# Patient Record
Sex: FEMALE | Race: WHITE | Employment: OTHER | URBAN - METROPOLITAN AREA
[De-identification: names, ages, dates, MRNs, and addresses within clinical notes are randomized per-mention and may not be internally consistent; named-entity substitution may affect disease eponyms.]

---

## 2018-02-06 PROBLEM — Z17.1 MALIGNANT NEOPLASM OF OVERLAPPING SITES OF RIGHT BREAST IN FEMALE, ESTROGEN RECEPTOR NEGATIVE (HCC): Status: ACTIVE | Noted: 2018-02-06

## 2018-02-06 PROBLEM — C50.811 MALIGNANT NEOPLASM OF OVERLAPPING SITES OF RIGHT BREAST IN FEMALE, ESTROGEN RECEPTOR NEGATIVE (HCC): Status: ACTIVE | Noted: 2018-02-06

## 2018-05-03 PROBLEM — M54.9 CHRONIC BACK PAIN: Status: ACTIVE | Noted: 2018-05-03

## 2018-05-03 PROBLEM — M79.18 MYOFASCIAL PAIN: Status: ACTIVE | Noted: 2018-05-03

## 2018-05-03 PROBLEM — G89.29 CHRONIC BACK PAIN: Status: ACTIVE | Noted: 2018-05-03

## 2018-08-08 ENCOUNTER — OFFICE VISIT (OUTPATIENT)
Dept: NEUROLOGY | Age: 73
End: 2018-08-08
Payer: MEDICARE

## 2018-08-08 VITALS
HEIGHT: 65 IN | WEIGHT: 136 LBS | HEART RATE: 70 BPM | DIASTOLIC BLOOD PRESSURE: 81 MMHG | BODY MASS INDEX: 22.66 KG/M2 | SYSTOLIC BLOOD PRESSURE: 148 MMHG

## 2018-08-08 DIAGNOSIS — G62.9 PERIPHERAL POLYNEUROPATHY: ICD-10-CM

## 2018-08-08 DIAGNOSIS — I15.8 OTHER SECONDARY HYPERTENSION: ICD-10-CM

## 2018-08-08 DIAGNOSIS — M79.18 MYOFASCIAL PAIN SYNDROME: Primary | ICD-10-CM

## 2018-08-08 PROCEDURE — G8427 DOCREV CUR MEDS BY ELIG CLIN: HCPCS | Performed by: PSYCHIATRY & NEUROLOGY

## 2018-08-08 PROCEDURE — 1090F PRES/ABSN URINE INCON ASSESS: CPT | Performed by: PSYCHIATRY & NEUROLOGY

## 2018-08-08 PROCEDURE — G8400 PT W/DXA NO RESULTS DOC: HCPCS | Performed by: PSYCHIATRY & NEUROLOGY

## 2018-08-08 PROCEDURE — G8420 CALC BMI NORM PARAMETERS: HCPCS | Performed by: PSYCHIATRY & NEUROLOGY

## 2018-08-08 PROCEDURE — 1101F PT FALLS ASSESS-DOCD LE1/YR: CPT | Performed by: PSYCHIATRY & NEUROLOGY

## 2018-08-08 PROCEDURE — 3017F COLORECTAL CA SCREEN DOC REV: CPT | Performed by: PSYCHIATRY & NEUROLOGY

## 2018-08-08 PROCEDURE — 99204 OFFICE O/P NEW MOD 45 MIN: CPT | Performed by: PSYCHIATRY & NEUROLOGY

## 2018-08-08 PROCEDURE — 1036F TOBACCO NON-USER: CPT | Performed by: PSYCHIATRY & NEUROLOGY

## 2018-08-08 PROCEDURE — 4040F PNEUMOC VAC/ADMIN/RCVD: CPT | Performed by: PSYCHIATRY & NEUROLOGY

## 2018-08-08 PROCEDURE — 1123F ACP DISCUSS/DSCN MKR DOCD: CPT | Performed by: PSYCHIATRY & NEUROLOGY

## 2018-08-08 NOTE — PROGRESS NOTES
Other Topics Concern    Not on file     Social History Narrative    No narrative on file       MEDICATIONS:     Current Outpatient Prescriptions   Medication Sig Dispense Refill    CRANBERRY PO Take 36 mg by mouth daily      COD LIVER OIL PO Take by mouth daily      levothyroxine (SYNTHROID) 112 MCG tablet   0    liothyronine (CYTOMEL) 5 MCG tablet Take 5 mcg by mouth      dicyclomine (BENTYL) 10 MG capsule Take 10 mg by mouth      CHROMIUM PO       Copper Gluconate 2 MG CAPS Copper Caps CAPSTAKE 2 CAPSULE Daily Refills: 0Active      magnesium gluconate (MAGONATE) 500 MG tablet Take 500 mg by mouth 2 times daily      Multiple Vitamins-Minerals (THERAPEUTIC MULTIVITAMIN-MINERALS) tablet Take 1 tablet by mouth daily      Turmeric (CURCUMIN 95 PO) Take by mouth      ASTAXANTHIN PO Take by mouth      BROCCOLI EXTRACT PO Take by mouth      TAURINE PO Take by mouth      Probiotic Product (PROBIOTIC ADVANCED PO) Take by mouth       No current facility-administered medications for this visit.          ALLERGIES:     Allergies   Allergen Reactions    Sulfa Antibiotics Nausea And Vomiting         REVIEW OF SYSTEMS:      CONSTITUTIONAL Weight change: , Appetite change: absent, Fatigue: absent    HEENT Ears: normal, Visual disturbance: absent    RESPIRATORY Shortness of breath: absent, Cough: absent    CARDIOVASCULAR Chest pain: absent, Leg swelling :absent    GI Constipation: absent, Diarrhea: absent, Swallowing change: absent     Urinary frequency: present, Urinary urgency: present, Urinary incontinence: absent    MUSCULOSKELETAL Neck pain: present, Back pain: present, Stiffness: present, Muscle pain: present, Joint pain: present Restless legs: present    DERMATOLOGIC Hair loss: absent, Skin changes: absent    NEUROLOGIC Memory loss: absent, Confusion: absent, Seizures: absent Trouble walking or imbalance: present, Dizziness: absent, Weakness: present, Numbness: present Tremor: absent, Spasm: absent,

## 2019-09-17 PROBLEM — M54.50 BILATERAL LOW BACK PAIN WITHOUT SCIATICA: Status: ACTIVE | Noted: 2019-09-17

## 2020-06-10 ENCOUNTER — HOSPITAL ENCOUNTER (OUTPATIENT)
Dept: MAMMOGRAPHY | Facility: CLINIC | Age: 75
Discharge: HOME | End: 2020-06-12
Payer: MEDICARE

## 2020-06-10 PROCEDURE — 77080 DXA BONE DENSITY AXIAL: CPT

## 2021-04-02 ENCOUNTER — APPOINTMENT (RX ONLY)
Dept: URBAN - METROPOLITAN AREA CLINIC 216 | Facility: CLINIC | Age: 76
Setting detail: DERMATOLOGY
End: 2021-04-02

## 2021-04-02 DIAGNOSIS — Z41.9 ENCOUNTER FOR PROCEDURE FOR PURPOSES OTHER THAN REMEDYING HEALTH STATE, UNSPECIFIED: ICD-10-CM

## 2021-04-02 PROCEDURE — ? RESTYLANE REFYNE INJECTION

## 2021-04-02 NOTE — PROCEDURE: RESTYLANE REFYNE INJECTION
Price (Use Numbers Only, No Special Characters Or $): 449 Price (Use Numbers Only, No Special Characters Or $): 778

## 2021-08-06 ENCOUNTER — HOSPITAL ENCOUNTER (INPATIENT)
Age: 76
LOS: 2 days | Discharge: HOME | DRG: 948 | End: 2021-08-10
Attending: EMERGENCY MEDICINE | Admitting: INTERNAL MEDICINE
Payer: MEDICARE

## 2021-08-06 DIAGNOSIS — R11.2 NON-INTRACTABLE VOMITING WITH NAUSEA, UNSPECIFIED VOMITING TYPE: ICD-10-CM

## 2021-08-06 DIAGNOSIS — R53.1 GENERAL WEAKNESS: Primary | ICD-10-CM

## 2021-08-06 DIAGNOSIS — R26.2 UNABLE TO AMBULATE: ICD-10-CM

## 2021-08-06 LAB
-: ABNORMAL
ABSOLUTE EOS #: 0.1 K/UL (ref 0–0.4)
ABSOLUTE IMMATURE GRANULOCYTE: ABNORMAL K/UL (ref 0–0.3)
ABSOLUTE LYMPH #: 0.8 K/UL (ref 1–4.8)
ABSOLUTE MONO #: 0.4 K/UL (ref 0.1–1.2)
ALBUMIN SERPL-MCNC: 4.7 G/DL (ref 3.5–5.2)
ALBUMIN/GLOBULIN RATIO: 1.5 (ref 1–2.5)
ALP BLD-CCNC: 75 U/L (ref 35–104)
ALT SERPL-CCNC: 16 U/L (ref 5–33)
AMORPHOUS: ABNORMAL
ANION GAP SERPL CALCULATED.3IONS-SCNC: 13 MMOL/L (ref 9–17)
AST SERPL-CCNC: 16 U/L
BACTERIA: ABNORMAL
BASOPHILS # BLD: 1 % (ref 0–2)
BASOPHILS ABSOLUTE: 0 K/UL (ref 0–0.2)
BILIRUB SERPL-MCNC: 0.4 MG/DL (ref 0.3–1.2)
BILIRUBIN URINE: NEGATIVE
BUN BLDV-MCNC: 16 MG/DL (ref 8–23)
BUN/CREAT BLD: ABNORMAL (ref 9–20)
CALCIUM SERPL-MCNC: 9.5 MG/DL (ref 8.6–10.4)
CASTS UA: ABNORMAL /LPF (ref 0–2)
CHLORIDE BLD-SCNC: 102 MMOL/L (ref 98–107)
CO2: 26 MMOL/L (ref 20–31)
COLOR: YELLOW
COMMENT UA: ABNORMAL
CREAT SERPL-MCNC: 0.7 MG/DL (ref 0.5–0.9)
CRYSTALS, UA: ABNORMAL /HPF
DIFFERENTIAL TYPE: ABNORMAL
EKG ATRIAL RATE: 62 BPM
EKG P AXIS: 61 DEGREES
EKG P-R INTERVAL: 194 MS
EKG Q-T INTERVAL: 438 MS
EKG QRS DURATION: 82 MS
EKG QTC CALCULATION (BAZETT): 444 MS
EKG R AXIS: 2 DEGREES
EKG T AXIS: 42 DEGREES
EKG VENTRICULAR RATE: 62 BPM
EOSINOPHILS RELATIVE PERCENT: 1 % (ref 1–4)
EPITHELIAL CELLS UA: ABNORMAL /HPF (ref 0–5)
GFR AFRICAN AMERICAN: >60 ML/MIN
GFR NON-AFRICAN AMERICAN: >60 ML/MIN
GFR SERPL CREATININE-BSD FRML MDRD: ABNORMAL ML/MIN/{1.73_M2}
GFR SERPL CREATININE-BSD FRML MDRD: ABNORMAL ML/MIN/{1.73_M2}
GLUCOSE BLD-MCNC: 119 MG/DL (ref 70–99)
GLUCOSE URINE: NEGATIVE
HCT VFR BLD CALC: 42.3 % (ref 36–46)
HEMOGLOBIN: 14.2 G/DL (ref 12–16)
IMMATURE GRANULOCYTES: ABNORMAL %
KETONES, URINE: ABNORMAL
LEUKOCYTE ESTERASE, URINE: NEGATIVE
LYMPHOCYTES # BLD: 10 % (ref 24–44)
MCH RBC QN AUTO: 33.3 PG (ref 26–34)
MCHC RBC AUTO-ENTMCNC: 33.6 G/DL (ref 31–37)
MCV RBC AUTO: 99.1 FL (ref 80–100)
MONOCYTES # BLD: 5 % (ref 2–11)
MUCUS: ABNORMAL
NITRITE, URINE: NEGATIVE
NRBC AUTOMATED: ABNORMAL PER 100 WBC
OTHER OBSERVATIONS UA: ABNORMAL
PDW BLD-RTO: 13.6 % (ref 12.5–15.4)
PH UA: 7.5 (ref 5–8)
PLATELET # BLD: 365 K/UL (ref 140–450)
PLATELET ESTIMATE: ABNORMAL
PMV BLD AUTO: 6.9 FL (ref 6–12)
POTASSIUM SERPL-SCNC: 3.8 MMOL/L (ref 3.7–5.3)
PROTEIN UA: NEGATIVE
RBC # BLD: 4.26 M/UL (ref 4–5.2)
RBC # BLD: ABNORMAL 10*6/UL
RBC UA: ABNORMAL /HPF (ref 0–2)
RENAL EPITHELIAL, UA: ABNORMAL /HPF
SEG NEUTROPHILS: 83 % (ref 36–66)
SEGMENTED NEUTROPHILS ABSOLUTE COUNT: 6.9 K/UL (ref 1.8–7.7)
SODIUM BLD-SCNC: 141 MMOL/L (ref 135–144)
SPECIFIC GRAVITY UA: 1.02 (ref 1–1.03)
TOTAL PROTEIN: 7.8 G/DL (ref 6.4–8.3)
TRICHOMONAS: ABNORMAL
TROPONIN INTERP: NORMAL
TROPONIN INTERP: NORMAL
TROPONIN T: NORMAL NG/ML
TROPONIN T: NORMAL NG/ML
TROPONIN, HIGH SENSITIVITY: 10 NG/L (ref 0–14)
TROPONIN, HIGH SENSITIVITY: 11 NG/L (ref 0–14)
TURBIDITY: CLEAR
URINE HGB: ABNORMAL
UROBILINOGEN, URINE: NORMAL
WBC # BLD: 8.2 K/UL (ref 3.5–11)
WBC # BLD: ABNORMAL 10*3/UL
WBC UA: ABNORMAL /HPF (ref 0–5)
YEAST: ABNORMAL

## 2021-08-06 PROCEDURE — 84484 ASSAY OF TROPONIN QUANT: CPT

## 2021-08-06 PROCEDURE — 96374 THER/PROPH/DIAG INJ IV PUSH: CPT

## 2021-08-06 PROCEDURE — 36415 COLL VENOUS BLD VENIPUNCTURE: CPT

## 2021-08-06 PROCEDURE — 93005 ELECTROCARDIOGRAM TRACING: CPT | Performed by: EMERGENCY MEDICINE

## 2021-08-06 PROCEDURE — 99284 EMERGENCY DEPT VISIT MOD MDM: CPT

## 2021-08-06 PROCEDURE — 6360000002 HC RX W HCPCS: Performed by: EMERGENCY MEDICINE

## 2021-08-06 PROCEDURE — 80053 COMPREHEN METABOLIC PANEL: CPT

## 2021-08-06 PROCEDURE — 6370000000 HC RX 637 (ALT 250 FOR IP): Performed by: EMERGENCY MEDICINE

## 2021-08-06 PROCEDURE — 2580000003 HC RX 258: Performed by: EMERGENCY MEDICINE

## 2021-08-06 PROCEDURE — 81001 URINALYSIS AUTO W/SCOPE: CPT

## 2021-08-06 PROCEDURE — 85025 COMPLETE CBC W/AUTO DIFF WBC: CPT

## 2021-08-06 PROCEDURE — 96375 TX/PRO/DX INJ NEW DRUG ADDON: CPT

## 2021-08-06 PROCEDURE — 96361 HYDRATE IV INFUSION ADD-ON: CPT

## 2021-08-06 RX ORDER — PROMETHAZINE HYDROCHLORIDE 25 MG/ML
12.5 INJECTION, SOLUTION INTRAMUSCULAR; INTRAVENOUS ONCE
Status: COMPLETED | OUTPATIENT
Start: 2021-08-06 | End: 2021-08-06

## 2021-08-06 RX ORDER — ONDANSETRON 2 MG/ML
4 INJECTION INTRAMUSCULAR; INTRAVENOUS ONCE
Status: COMPLETED | OUTPATIENT
Start: 2021-08-06 | End: 2021-08-06

## 2021-08-06 RX ORDER — 0.9 % SODIUM CHLORIDE 0.9 %
1000 INTRAVENOUS SOLUTION INTRAVENOUS ONCE
Status: COMPLETED | OUTPATIENT
Start: 2021-08-06 | End: 2021-08-06

## 2021-08-06 RX ORDER — SODIUM CHLORIDE 9 MG/ML
INJECTION, SOLUTION INTRAVENOUS CONTINUOUS
Status: DISCONTINUED | OUTPATIENT
Start: 2021-08-06 | End: 2021-08-07 | Stop reason: DRUGHIGH

## 2021-08-06 RX ORDER — MECLIZINE HCL 12.5 MG/1
25 TABLET ORAL ONCE
Status: COMPLETED | OUTPATIENT
Start: 2021-08-06 | End: 2021-08-06

## 2021-08-06 RX ADMIN — ONDANSETRON 4 MG: 2 INJECTION INTRAMUSCULAR; INTRAVENOUS at 14:36

## 2021-08-06 RX ADMIN — SODIUM CHLORIDE 1000 ML: 9 INJECTION, SOLUTION INTRAVENOUS at 14:36

## 2021-08-06 RX ADMIN — SODIUM CHLORIDE: 9 INJECTION, SOLUTION INTRAVENOUS at 18:50

## 2021-08-06 RX ADMIN — SODIUM CHLORIDE 1000 ML: 9 INJECTION, SOLUTION INTRAVENOUS at 15:50

## 2021-08-06 RX ADMIN — MECLIZINE HCL 12.5 MG 25 MG: 12.5 TABLET ORAL at 15:42

## 2021-08-06 RX ADMIN — PROMETHAZINE HYDROCHLORIDE 12.5 MG: 25 INJECTION INTRAMUSCULAR; INTRAVENOUS at 16:00

## 2021-08-06 ASSESSMENT — ENCOUNTER SYMPTOMS
SHORTNESS OF BREATH: 0
VOMITING: 1
BACK PAIN: 1
ABDOMINAL PAIN: 0
DIARRHEA: 0
CONSTIPATION: 0
NAUSEA: 1
COUGH: 0
BLOOD IN STOOL: 0
EYE PAIN: 0

## 2021-08-06 ASSESSMENT — PAIN DESCRIPTION - PROGRESSION: CLINICAL_PROGRESSION: NOT CHANGED

## 2021-08-06 ASSESSMENT — PAIN DESCRIPTION - ONSET: ONSET: GRADUAL

## 2021-08-06 ASSESSMENT — PAIN DESCRIPTION - ORIENTATION: ORIENTATION: LOWER

## 2021-08-06 ASSESSMENT — PAIN DESCRIPTION - FREQUENCY: FREQUENCY: CONTINUOUS

## 2021-08-06 ASSESSMENT — PAIN SCALES - GENERAL: PAINLEVEL_OUTOF10: 8

## 2021-08-06 ASSESSMENT — PAIN DESCRIPTION - DESCRIPTORS: DESCRIPTORS: THROBBING

## 2021-08-06 ASSESSMENT — PAIN DESCRIPTION - PAIN TYPE: TYPE: ACUTE PAIN

## 2021-08-06 ASSESSMENT — PAIN DESCRIPTION - LOCATION: LOCATION: SACRUM;BACK

## 2021-08-06 NOTE — Clinical Note
Patient Class: Observation [104]   REQUIRED: Diagnosis: Weakness [953304]   Estimated Length of Stay: Estimated stay of less than 2 midnights   Admitting Provider: Ivy Chino [[id number]]

## 2021-08-06 NOTE — ED NOTES
Pt c/o persisting nausea. Dr. Jaja Calix in with another pt, will update when he is available. Pt provided with fresh warm blanket and tissues per request.    remains at bedside. No distress noted. Will continue to monitor.         Dayna Galicia RN  08/06/21 2917

## 2021-08-06 NOTE — ED PROVIDER NOTES
[facility]  [address] [facility] [address]  Phone: [phone]        Pt Name: Noemí Bryan  MRN: [id number]  [place] 1945  Date of evaluation: 8/6/21      CHIEF COMPLAINT       Chief Complaint   Patient presents with    Medication Reaction     Prialt    Emesis    Nausea    Difficulty Walking    Chills         HISTORY OF PRESENT ILLNESS    Noemí Bryan is a 66 y.o. female who presents with medication reaction, patient went in for a injection in the pain Center today to her back the medication was pre all patient says she has had a previous reaction to it but got the medication again today she said she did well for couple hours after have the medication but as soon as she got home she developed nausea vomiting weakness chills no chest pain or shortness of breath she said she is vomited multiple times she opens her eyes she feels like vomiting patient says this was very similar to what she had last time she had the injection. Patient has chronic lower back pain    REVIEW OF SYSTEMS         Review of Systems   Constitutional: Negative for chills and fever. HENT: Negative for congestion and ear pain. Eyes: Negative for pain and visual disturbance. Respiratory: Negative for cough and shortness of breath. Cardiovascular: Negative for chest pain, palpitations and leg swelling. Gastrointestinal: Positive for nausea and vomiting. Negative for abdominal pain, blood in stool, constipation and diarrhea. Endocrine: Negative for polydipsia and polyuria. Genitourinary: Negative for difficulty urinating, dysuria and frequency. Musculoskeletal: Positive for back pain. Negative for joint swelling, myalgias, neck pain and neck stiffness. Skin: Negative for rash. Neurological: Positive for dizziness and light-headedness. Negative for weakness and headaches. Hematological: Negative for adenopathy. Does not bruise/bleed easily.    Psychiatric/Behavioral: Negative for confusion, self-injury and suicidal ideas. PAST MEDICAL HISTORY    has a past medical history of Cancer ([address] Chronic back pain, and Hyperthyroidism. SURGICAL HISTORY      has a past surgical history that includes Spine surgery (1989, 1990, 2008); Breast surgery (Left, 10/2012); and Abdominoplasty. CURRENT MEDICATIONS       Previous Medications    ASTAXANTHIN PO    Take by mouth    ATORVASTATIN (LIPITOR) 10 MG TABLET    Take 10 mg by mouth daily    BROCCOLI EXTRACT PO    Take by mouth    CHOLECALCIFEROL 50 MCG (2000 UT) TABS    Take 2,000 Units by mouth daily    CHROMIUM PO        COD LIVER OIL PO    Take by mouth daily    COPPER GLUCONATE 2 MG CAPS    Copper Caps CAPSTAKE 2 CAPSULE Daily Refills: 0Active    CRANBERRY PO    Take 36 mg by mouth daily    GABAPENTIN (NEURONTIN) 300 MG CAPSULE    take 1 capsule by mouth at bedtime    GABAPENTIN (NEURONTIN) 400 MG CAPSULE    Take 400 mg by mouth 3 times daily. LEVOTHYROXINE (SYNTHROID) 112 MCG TABLET    110 mcg     LEVOTHYROXINE (SYNTHROID) 50 MCG TABLET    take 1 tablet by mouth once daily    LIOTHYRONINE (CYTOMEL) 5 MCG TABLET    Take 5 mcg by mouth    MAGNESIUM GLUCONATE (MAGONATE) 500 MG TABLET    Take 500 mg by mouth 2 times daily    METOPROLOL SUCCINATE (TOPROL XL) 25 MG EXTENDED RELEASE TABLET    Take 25 mg by mouth nightly    MULTIPLE VITAMINS-MINERALS (THERAPEUTIC MULTIVITAMIN-MINERALS) TABLET    Take 1 tablet by mouth daily    NP THYROID 60 MG TABLET    take 1 tablet by mouth once daily ON AN EMPTY STOMACH    NYSTATIN (MYCOSTATIN) POWD POWDER    Apply topically 2 times daily    PROBIOTIC PRODUCT (PROBIOTIC ADVANCED PO)    Take by mouth    TAURINE PO    Take by mouth    TRAMADOL (ULTRAM) 50 MG TABLET    Take 50 mg by mouth every 6 hours as needed for Pain. TURMERIC (CURCUMIN 95 PO)    Take by mouth       ALLERGIES     is allergic to ziconotide acetate and sulfa antibiotics.     FAMILY HISTORY     She indicated that the status of her mother is unknown. She indicated that the status of her father is unknown. She indicated that the status of her sister is unknown. She indicated that the status of her maternal grandmother is unknown.     family history includes Breast Cancer in her maternal grandmother; Cancer in her mother; Heart Disease in her father and sister; Stroke in her father and sister. SOCIAL HISTORY      reports that she has never smoked. She has never used smokeless tobacco. She reports current alcohol use. She reports that she does not use drugs. PHYSICAL EXAM     INITIAL VITALS:  height is 5' 5\" (1.651 m) and weight is 64.4 kg (142 lb). Her oral temperature is 97.9 °F (36.6 °C). Her blood pressure is 163/74 (abnormal) and her pulse is 62. Her respiration is 16 and oxygen saturation is 100%. Physical Exam  Vitals and nursing note reviewed. Constitutional:       Appearance: Normal appearance. She is well-developed. Comments: Patient's laying still with her eyes closed   HENT:      Head: Normocephalic and atraumatic. Left Ear: External ear normal.   Eyes:      Conjunctiva/sclera: Conjunctivae normal.      Pupils: Pupils are equal, round, and reactive to light. Cardiovascular:      Rate and Rhythm: Normal rate and regular rhythm. Pulmonary:      Effort: Pulmonary effort is normal.      Breath sounds: Normal breath sounds. Abdominal:      General: Bowel sounds are normal.      Palpations: Abdomen is soft. Musculoskeletal:         General: No tenderness. Normal range of motion. Cervical back: Normal range of motion. Skin:     General: Skin is warm and dry. Neurological:      General: No focal deficit present. Mental Status: She is alert and oriented to person, place, and time.    Psychiatric:         Behavior: Behavior normal.           DIFFERENTIAL DIAGNOSIS/ MDM:     Acacian reaction no evidence of anaphylaxis good blood pressure lungs are clear will treat with IV hydration Zofran check baseline labs given the nausea vomiting we will check an EKG and 1 troponin as well    DIAGNOSTIC RESULTS     EKG: All EKG's are interpreted by the Emergency Department Physician who either signs or Co-signs this chart in the absence of a cardiologist.    Sinus rhythm rate of 62 bpm AR interval is 194 ms QRS durations 82 ms  ms axis is -2 there is no acute ST or T wave changes seen    RADIOLOGY:   Non-plain film images such as CT, Ultrasound and MRI are read by the radiologist. Roro Mcmullen radiographic images are visualized and the radiologist interpretations are reviewed as follows:     LABS:  Results for orders placed or performed during the hospital encounter of 08/06/21   Comprehensive Metabolic Panel   Result Value Ref Range    Glucose 119 (H) 70 - 99 mg/dL    BUN 16 8 - 23 mg/dL    CREATININE 0.70 0.50 - 0.90 mg/dL    Bun/Cre Ratio NOT REPORTED 9 - 20    Calcium 9.5 8.6 - 10.4 mg/dL    Sodium 141 135 - 144 mmol/L    Potassium 3.8 3.7 - 5.3 mmol/L    Chloride 102 98 - 107 mmol/L    CO2 26 20 - 31 mmol/L    Anion Gap 13 9 - 17 mmol/L    Alkaline Phosphatase 75 35 - 104 U/L    ALT 16 5 - 33 U/L    AST 16 <32 U/L    Total Bilirubin 0.40 0.3 - 1.2 mg/dL    Total Protein 7.8 6.4 - 8.3 g/dL    Albumin 4.7 3.5 - 5.2 g/dL    Albumin/Globulin Ratio 1.5 1.0 - 2.5    GFR Non-African American >60 >60 mL/min    GFR African American >60 >60 mL/min    GFR Comment          GFR Staging NOT REPORTED    CBC Auto Differential   Result Value Ref Range    WBC 8.2 3.5 - 11.0 k/uL    RBC 4.26 4.0 - 5.2 m/uL    Hemoglobin 14.2 12.0 - 16.0 g/dL    Hematocrit 42.3 36 - 46 %    MCV 99.1 80 - 100 fL    MCH 33.3 26 - 34 pg    MCHC 33.6 31 - 37 g/dL    RDW 13.6 12.5 - 15.4 %    Platelets 953 339 - 778 k/uL    MPV 6.9 6.0 - 12.0 fL    NRBC Automated NOT REPORTED per 100 WBC    Differential Type NOT REPORTED     Seg Neutrophils 83 (H) 36 - 66 %    Lymphocytes 10 (L) 24 - 44 %    Monocytes 5 2 - 11 %    Eosinophils % 1 1 - 4 %    Basophils 1 0 - 2 % Immature Granulocytes NOT REPORTED 0 %    Segs Absolute 6.90 1.8 - 7.7 k/uL    Absolute Lymph # 0.80 (L) 1.0 - 4.8 k/uL    Absolute Mono # 0.40 0.1 - 1.2 k/uL    Absolute Eos # 0.10 0.0 - 0.4 k/uL    Basophils Absolute 0.00 0.0 - 0.2 k/uL    Absolute Immature Granulocyte NOT REPORTED 0.00 - 0.30 k/uL    WBC Morphology NOT REPORTED     RBC Morphology NOT REPORTED     Platelet Estimate NOT REPORTED    Troponin   Result Value Ref Range    Troponin, High Sensitivity 11 0 - 14 ng/L    Troponin T NOT REPORTED <0.03 ng/mL    Troponin Interp NOT REPORTED    Troponin   Result Value Ref Range    Troponin, High Sensitivity 10 0 - 14 ng/L    Troponin T NOT REPORTED <0.03 ng/mL    Troponin Interp NOT REPORTED    EKG 12 Lead   Result Value Ref Range    Ventricular Rate 62 BPM    Atrial Rate 62 BPM    P-R Interval 194 ms    QRS Duration 82 ms    Q-T Interval 438 ms    QTc Calculation (Bazett) 444 ms    P Axis 61 degrees    R Axis 2 degrees    T Axis 42 degrees           EMERGENCY DEPARTMENT COURSE:   Vitals:    Vitals:    08/06/21 1402   BP: (!) 163/74   Pulse: 62   Resp: 16   Temp: 97.9 °F (36.6 °C)   TempSrc: Oral   SpO2: 100%   Weight: 64.4 kg (142 lb)   Height: 5' 5\" (1.651 m)     -------------------------  BP: (!) 163/74, Temp: 97.9 °F (36.6 °C), Pulse: 62, Resp: 16      Patient's nausea is better but still feeling lightheaded and dizzy request more fluid    CONSULTS:  I did discuss the case with the ER physician at the pain clinic he said he gave her a very small injection of this medication with a 25-gauge needle down lower by her laminectomy scar he says it has a short half-life and should be worn off soon    PROCEDURES:  None    FINAL IMPRESSION    No diagnosis found. DISPOSITION/PLAN   Care is passed to the oncoming physician at the end of my shift pending results of treatment    PATIENT REFERRED TO:  No follow-up provider specified.     DISCHARGE MEDICATIONS:  New Prescriptions    No medications on file (Please note that portions of this note were completed with a voice recognition program.  Efforts were made to edit the dictations but occasionally words are mis-transcribed.)    Aden Arteaga MD,, MD, F.A.A.E.M.   Attending Emergency Medicine Physician      Aden Arteaga MD  08/06/21 4844

## 2021-08-06 NOTE — ED NOTES
Pt assisted off of bedpan. Pt appears in no distress.  asking for update on POC, Dr. Carlos Deras notified.         Maegan Mason RN  08/06/21 7333

## 2021-08-06 NOTE — ED NOTES
Bed: ER08  Expected date: 8/6/21  Expected time: 1:44 PM  Means of arrival: [facility]  Comments:  [place]  08/06/21 0244

## 2021-08-07 ENCOUNTER — APPOINTMENT (OUTPATIENT)
Dept: CT IMAGING | Facility: CLINIC | Age: 76
DRG: 948 | End: 2021-08-07
Payer: MEDICARE

## 2021-08-07 PROBLEM — E03.1 CONGENITAL HYPOTHYROIDISM WITHOUT GOITER: Status: ACTIVE | Noted: 2021-08-07

## 2021-08-07 PROBLEM — T50.905A ADVERSE REACTION TO DRUG: Status: ACTIVE | Noted: 2021-08-07

## 2021-08-07 PROBLEM — R53.1 WEAKNESS: Status: ACTIVE | Noted: 2021-08-07

## 2021-08-07 PROBLEM — R11.2 INTRACTABLE NAUSEA AND VOMITING: Status: ACTIVE | Noted: 2021-08-07

## 2021-08-07 PROBLEM — I10 ESSENTIAL HYPERTENSION: Status: ACTIVE | Noted: 2021-08-07

## 2021-08-07 PROCEDURE — 6370000000 HC RX 637 (ALT 250 FOR IP): Performed by: INTERNAL MEDICINE

## 2021-08-07 PROCEDURE — 96372 THER/PROPH/DIAG INJ SC/IM: CPT

## 2021-08-07 PROCEDURE — 6360000002 HC RX W HCPCS: Performed by: NURSE PRACTITIONER

## 2021-08-07 PROCEDURE — 70450 CT HEAD/BRAIN W/O DYE: CPT

## 2021-08-07 PROCEDURE — 99222 1ST HOSP IP/OBS MODERATE 55: CPT | Performed by: INTERNAL MEDICINE

## 2021-08-07 PROCEDURE — 51798 US URINE CAPACITY MEASURE: CPT

## 2021-08-07 PROCEDURE — G0378 HOSPITAL OBSERVATION PER HR: HCPCS

## 2021-08-07 PROCEDURE — 2580000003 HC RX 258: Performed by: NURSE PRACTITIONER

## 2021-08-07 PROCEDURE — 96361 HYDRATE IV INFUSION ADD-ON: CPT

## 2021-08-07 RX ORDER — ACETAMINOPHEN 325 MG/1
650 TABLET ORAL EVERY 6 HOURS PRN
Status: DISCONTINUED | OUTPATIENT
Start: 2021-08-07 | End: 2021-08-10 | Stop reason: HOSPADM

## 2021-08-07 RX ORDER — POTASSIUM CHLORIDE 7.45 MG/ML
10 INJECTION INTRAVENOUS PRN
Status: DISCONTINUED | OUTPATIENT
Start: 2021-08-07 | End: 2021-08-10 | Stop reason: HOSPADM

## 2021-08-07 RX ORDER — METOPROLOL SUCCINATE 25 MG/1
25 TABLET, EXTENDED RELEASE ORAL NIGHTLY
Status: DISCONTINUED | OUTPATIENT
Start: 2021-08-07 | End: 2021-08-07

## 2021-08-07 RX ORDER — SODIUM CHLORIDE 0.9 % (FLUSH) 0.9 %
10 SYRINGE (ML) INJECTION PRN
Status: DISCONTINUED | OUTPATIENT
Start: 2021-08-07 | End: 2021-08-10 | Stop reason: HOSPADM

## 2021-08-07 RX ORDER — SODIUM CHLORIDE 9 MG/ML
INJECTION, SOLUTION INTRAVENOUS CONTINUOUS
Status: DISCONTINUED | OUTPATIENT
Start: 2021-08-07 | End: 2021-08-09

## 2021-08-07 RX ORDER — GABAPENTIN 400 MG/1
400 CAPSULE ORAL 3 TIMES DAILY
Status: DISCONTINUED | OUTPATIENT
Start: 2021-08-07 | End: 2021-08-07

## 2021-08-07 RX ORDER — LEVOTHYROXINE SODIUM 0.05 MG/1
50 TABLET ORAL DAILY
Status: DISCONTINUED | OUTPATIENT
Start: 2021-08-08 | End: 2021-08-10 | Stop reason: HOSPADM

## 2021-08-07 RX ORDER — VITAMIN B COMPLEX
2000 TABLET ORAL DAILY
Status: DISCONTINUED | OUTPATIENT
Start: 2021-08-07 | End: 2021-08-10 | Stop reason: HOSPADM

## 2021-08-07 RX ORDER — METOPROLOL SUCCINATE 50 MG/1
75 TABLET, EXTENDED RELEASE ORAL NIGHTLY
Status: DISCONTINUED | OUTPATIENT
Start: 2021-08-07 | End: 2021-08-10 | Stop reason: HOSPADM

## 2021-08-07 RX ORDER — TRAMADOL HYDROCHLORIDE 50 MG/1
50 TABLET ORAL EVERY 6 HOURS PRN
Status: DISCONTINUED | OUTPATIENT
Start: 2021-08-07 | End: 2021-08-10 | Stop reason: HOSPADM

## 2021-08-07 RX ORDER — LEVOTHYROXINE AND LIOTHYRONINE 38; 9 UG/1; UG/1
60 TABLET ORAL DAILY
Status: DISCONTINUED | OUTPATIENT
Start: 2021-08-08 | End: 2021-08-10 | Stop reason: HOSPADM

## 2021-08-07 RX ORDER — ONDANSETRON 4 MG/1
4 TABLET, ORALLY DISINTEGRATING ORAL EVERY 8 HOURS PRN
Status: DISCONTINUED | OUTPATIENT
Start: 2021-08-07 | End: 2021-08-10 | Stop reason: HOSPADM

## 2021-08-07 RX ORDER — SODIUM CHLORIDE 0.9 % (FLUSH) 0.9 %
5-40 SYRINGE (ML) INJECTION EVERY 12 HOURS SCHEDULED
Status: DISCONTINUED | OUTPATIENT
Start: 2021-08-07 | End: 2021-08-10 | Stop reason: HOSPADM

## 2021-08-07 RX ORDER — GABAPENTIN 300 MG/1
300 CAPSULE ORAL 3 TIMES DAILY
Status: DISCONTINUED | OUTPATIENT
Start: 2021-08-07 | End: 2021-08-10 | Stop reason: HOSPADM

## 2021-08-07 RX ORDER — POLYETHYLENE GLYCOL 3350 17 G/17G
17 POWDER, FOR SOLUTION ORAL DAILY PRN
Status: DISCONTINUED | OUTPATIENT
Start: 2021-08-07 | End: 2021-08-10 | Stop reason: HOSPADM

## 2021-08-07 RX ORDER — MAGNESIUM SULFATE 1 G/100ML
1000 INJECTION INTRAVENOUS PRN
Status: DISCONTINUED | OUTPATIENT
Start: 2021-08-07 | End: 2021-08-10 | Stop reason: HOSPADM

## 2021-08-07 RX ORDER — LISINOPRIL 20 MG/1
20 TABLET ORAL DAILY
Status: DISCONTINUED | OUTPATIENT
Start: 2021-08-07 | End: 2021-08-10 | Stop reason: HOSPADM

## 2021-08-07 RX ORDER — ONDANSETRON 2 MG/ML
4 INJECTION INTRAMUSCULAR; INTRAVENOUS EVERY 6 HOURS PRN
Status: DISCONTINUED | OUTPATIENT
Start: 2021-08-07 | End: 2021-08-10 | Stop reason: HOSPADM

## 2021-08-07 RX ORDER — LEVOTHYROXINE SODIUM 112 UG/1
110 TABLET ORAL DAILY
Status: DISCONTINUED | OUTPATIENT
Start: 2021-08-07 | End: 2021-08-07

## 2021-08-07 RX ORDER — ACETAMINOPHEN 650 MG/1
650 SUPPOSITORY RECTAL EVERY 6 HOURS PRN
Status: DISCONTINUED | OUTPATIENT
Start: 2021-08-07 | End: 2021-08-10 | Stop reason: HOSPADM

## 2021-08-07 RX ORDER — SODIUM CHLORIDE 9 MG/ML
25 INJECTION, SOLUTION INTRAVENOUS PRN
Status: DISCONTINUED | OUTPATIENT
Start: 2021-08-07 | End: 2021-08-10 | Stop reason: HOSPADM

## 2021-08-07 RX ORDER — ATORVASTATIN CALCIUM 10 MG/1
10 TABLET, FILM COATED ORAL DAILY
Status: DISCONTINUED | OUTPATIENT
Start: 2021-08-07 | End: 2021-08-08

## 2021-08-07 RX ORDER — POTASSIUM CHLORIDE 20 MEQ/1
40 TABLET, EXTENDED RELEASE ORAL PRN
Status: DISCONTINUED | OUTPATIENT
Start: 2021-08-07 | End: 2021-08-10 | Stop reason: HOSPADM

## 2021-08-07 RX ADMIN — ENOXAPARIN SODIUM 40 MG: 40 INJECTION SUBCUTANEOUS at 10:43

## 2021-08-07 RX ADMIN — GABAPENTIN 300 MG: 300 CAPSULE ORAL at 20:33

## 2021-08-07 RX ADMIN — SODIUM CHLORIDE: 9 INJECTION, SOLUTION INTRAVENOUS at 20:57

## 2021-08-07 RX ADMIN — LISINOPRIL 20 MG: 20 TABLET ORAL at 13:33

## 2021-08-07 RX ADMIN — METOPROLOL SUCCINATE 75 MG: 50 TABLET, EXTENDED RELEASE ORAL at 20:34

## 2021-08-07 RX ADMIN — SODIUM CHLORIDE: 9 INJECTION, SOLUTION INTRAVENOUS at 10:40

## 2021-08-07 ASSESSMENT — PAIN DESCRIPTION - LOCATION: LOCATION: BACK

## 2021-08-07 ASSESSMENT — PAIN SCALES - GENERAL
PAINLEVEL_OUTOF10: 0
PAINLEVEL_OUTOF10: 0

## 2021-08-07 ASSESSMENT — PAIN DESCRIPTION - PAIN TYPE: TYPE: CHRONIC PAIN

## 2021-08-07 NOTE — ED NOTES
Per Dr. Layla Stanford, it is ok for pt to take her home medication, metoprolol and gabapentin.       Cheryle Perez RN  08/07/21 3949

## 2021-08-07 NOTE — ED NOTES
Called Arianna, request page out for hospitalist at [facility] for admit.       Juan F Garcia RN  08/07/21 5361

## 2021-08-07 NOTE — ED NOTES
Called the [facility] states they are discharge dependent for beds. Updated pt.       Keiry Toure RN  08/07/21 1422

## 2021-08-07 NOTE — ED NOTES
Lindsey Gordon RN, and [facility] RN attempted to sit pt up to assist her to the bedside commode. Pt states \"when I sit up, my nervous systems go out of whack. \" Pt had involuntary movement of the head. Pt placed back in bed, bedpan placed underneath.       Lina Hurt RN  08/07/21 1972

## 2021-08-07 NOTE — ED NOTES
PT given apple sauce. Pt remains on [place] / suction . Pt declined juice.       Marcella Ribeiro RN  08/07/21 2455

## 2021-08-07 NOTE — H&P
[facility]  Office: [address] [facility], DO, Tigist Ana, DO, Luis Anglin, DO, Yolie Gonzales, DO, Valiant Sicard, MD, Reed Barkley MD, Blaise Farris MD, Shayla Anthony MD, Rafiq Collins MD, Yuliya Minaya MD, Crispin Allen MD, Parveen Acosta, DO, Elvis Olivares MD, Hattie De La Fuente DO, Nishi Gonzalez MD,  Alexis Villa DO, Leila Rehman MD, Anita Pitts MD, Mery Wright MD, Rigoberto Can MD, Tristen Patel MD, Ari Snowden MD, Carolina Goff MD, Merlin Covarrubias, [facility], [facility], [facility], Layla Mcdaniel, [facility], Lionel Lares, CNS, Lia Garcia, CNP, Erin Tripp, CNP, Leia Clemente, CNP, Nnamdi Starr, CNP, Jameson Godinez, CNP, Jono Alonso PA-C, Alexis Obrien, [facility], David Lockhart, CNP, Jericho Gomez, CNP, Mariana Rock, CNP, Dhereaj Mora, CNP, Derrick Mattson, CNP, Winnie Chua, CNP, Tiffany Acuña, [facility], [facility], [place]    HISTORY AND PHYSICAL EXAMINATION            Date:   8/7/2021  Patient name:  Ousmane Finley  Date of admission:  8/6/2021  1:47 PM  MRN:   [id number]  Account:  [de-identified]  YOB: 1945  PCP:    Prvaeen Walters MD  Room:   1016/1016-02  Code Status:    Full Code    Chief Complaint:     Chief Complaint   Patient presents with    Medication Reaction     Prialt    Emesis    Nausea    Difficulty Walking    Chills       History Obtained From:     patient    History of Present Illness:     Ousmane Finley is a 66 y.o. Non- / non  female who presents with Medication Reaction (Prialt), Emesis, Nausea, Difficulty Walking, and Chills   and is admitted to the hospital for the management of Adverse reaction to drug. This is a 78-year-old female with a history of chronic back pain and follows with Dr. Stephanie Magana for pain management.   Yesterday morning she had a Prialt injection and shortly thereafter developed complaints of nausea, vomiting, increased weakness and difficulty walking and chills. She had a previous Prialt injection had a similar response, the symptoms but more severe. She reports abdominal distention this morning and difficulty urinating, has a history of urinary frequency and has Botox injections with Dr. Jennifer Lowe. She is slowly improving with IV hydration and rest.  She denies any other associated symptoms or modifying factors. Denies any rash, itching, chest pressure, shortness of breath or other symptoms. Past Medical History:     Past Medical History:   Diagnosis Date    Cancer ([facility] [address] 10/2012    breast, left    Chronic back pain     Hyperthyroidism     Obstructive sleep apnea syndrome     Urge incontinence         Past Surgical History:     Past Surgical History:   Procedure Laterality Date    ABDOMINOPLASTY      BREAST SURGERY Left 10/2012    [id number] [place] [address], [facility]    has had 3 back surgeries        Medications Prior to Admission:     Prior to Admission medications    Medication Sig Start Date End Date Taking? Authorizing Provider   gabapentin (NEURONTIN) 300 MG capsule take 1 capsule by mouth at bedtime 3/29/21 6/27/21  Juan J Mathews MD   Cholecalciferol 50 MCG (2000 UT) TABS Take 2,000 Units by mouth daily    Historical Provider, MD   levothyroxine (SYNTHROID) 50 MCG tablet take 1 tablet by mouth once daily 12/14/20   Historical Provider, MD   metoprolol succinate (TOPROL XL) 25 MG extended release tablet Take 25 mg by mouth nightly 1/27/21   Historical Provider, MD   NP THYROID 60 MG tablet take 1 tablet by mouth once daily ON AN EMPTY STOMACH 12/2/20   Historical Provider, MD   traMADol (ULTRAM) 50 MG tablet Take 50 mg by mouth every 6 hours as needed for Pain. Historical Provider, MD   gabapentin (NEURONTIN) 400 MG capsule Take 400 mg by mouth 3 times daily.     Historical Provider, MD   atorvastatin (LIPITOR) 10 MG tablet Take 10 mg by mouth daily    Historical Provider, MD   nystatin (MYCOSTATIN) POWD powder Apply topically 2 times daily    Historical Provider, MD   CRANBERRY PO Take 36 mg by mouth daily    Historical Provider, MD   COD LIVER OIL PO Take by mouth daily    Historical Provider, MD   levothyroxine (SYNTHROID) 112 MCG tablet 110 mcg  7/2/17   Historical Provider, MD   liothyronine (CYTOMEL) 5 MCG tablet Take 5 mcg by mouth 10/7/16   Historical Provider, MD   CHROMIUM PO     Historical Provider, MD   Copper Gluconate 2 MG CAPS Copper Caps CAPSTAKE 2 CAPSULE Daily Refills: 0Active    Historical Provider, MD   magnesium gluconate (MAGONATE) 500 MG tablet Take 500 mg by mouth 2 times daily    Historical Provider, MD   Multiple Vitamins-Minerals (THERAPEUTIC MULTIVITAMIN-MINERALS) tablet Take 1 tablet by mouth daily    Historical Provider, MD   Turmeric (CURCUMIN 95 PO) Take by mouth    Historical Provider, MD   ASTAXANTHIN PO Take by mouth    Historical Provider, MD   BROCCOLI EXTRACT PO Take by mouth    Historical Provider, MD   TAURINE PO Take by mouth    Historical Provider, MD   Probiotic Product (PROBIOTIC ADVANCED PO) Take by mouth    Historical Provider, MD        Allergies:     Ziconotide acetate and Sulfa antibiotics    Social History:     Tobacco:    reports that she has never smoked. She has never used smokeless tobacco.  Alcohol:      reports current alcohol use. Drug Use:  reports no history of drug use. Family History:     Family History   Problem Relation Age of Onset   [facility] Cancer Mother     Heart Disease Father     Stroke Father     Heart Disease Sister     Stroke Sister     Breast Cancer Maternal Grandmother        Review of Systems:     Positive and Negative as described in HPI.     CONSTITUTIONAL:  negative for fevers, chills, sweats, fatigue, weight loss  HEENT:  negative for vision, hearing changes, runny nose, throat pain  RESPIRATORY:  negative for shortness of breath, cough, congestion, wheezing  CARDIOVASCULAR:  negative for chest pain, palpitations  GASTROINTESTINAL: Positive for nausea, vomiting, negative for diarrhea, constipation, change in bowel habits, abdominal pain   GENITOURINARY:  negative for difficulty of urination, burning with urination, frequency   INTEGUMENT:  negative for rash, skin lesions, easy bruising   HEMATOLOGIC/LYMPHATIC:  negative for swelling/edema   ALLERGIC/IMMUNOLOGIC:  negative for urticaria , itching  ENDOCRINE:  negative increase in drinking, increase in urination, hot or cold intolerance  MUSCULOSKELETAL: Positive for back and joint pains, negative for muscle aches, swelling of joints  NEUROLOGICAL:  negative for headaches, dizziness, lightheadedness, numbness, pain, tingling extremities, positive for generalized weakness  BEHAVIOR/PSYCH:  negative for depression, anxiety    Physical Exam:   BP (!) 156/66   Pulse 63   Temp 98.6 °F (37 °C) (Oral)   Resp 14   Ht 5' 5\" (1.651 m)   Wt 142 lb (64.4 kg)   SpO2 95%   BMI 23.63 kg/m²   Temp (24hrs), Av.4 °F (36.9 °C), Min:97.9 °F (36.6 °C), Max:98.7 °F (37.1 °C)    No results for input(s): POCGLU in the last 72 hours. Intake/Output Summary (Last 24 hours) at 2021 1152  Last data filed at 2021 1040  Gross per 24 hour   Intake 2000 ml   Output 1050 ml   Net 950 ml       General Appearance:  alert, well appearing, and in no acute distress  Mental status: oriented to person, place, and time  Head:  normocephalic, atraumatic  Eye: no icterus, redness, pupils equal and reactive, extraocular eye movements intact, conjunctiva clear  Ear: normal external ear, no discharge, hearing intact  Nose:  no drainage noted  Mouth: mucous membranes moist  Neck: supple, no carotid bruits, thyroid not palpable  Lungs: Bilateral equal air entry, clear to auscultation, no wheezing, rales or rhonchi, normal effort  Cardiovascular: normal rate, regular rhythm, no murmur, gallop, rub.   Abdomen: Soft, lower abdominal tenderness with palpable bladder, nondistended, normal bowel sounds, no hepatomegaly or splenomegaly  Neurologic: There are no new focal motor or sensory deficits, normal muscle tone and bulk, no abnormal sensation, normal speech, cranial nerves II through XII grossly intact  Skin: No gross lesions, rashes, bruising or bleeding on exposed skin area  Extremities:  peripheral pulses palpable, no pedal edema or calf pain with palpation  Psych: normal affect     Investigations:      Laboratory Testing:  Recent Results (from the past 24 hour(s))   EKG 12 Lead    Collection Time: 08/06/21  2:29 PM   Result Value Ref Range    Ventricular Rate 62 BPM    Atrial Rate 62 BPM    P-R Interval 194 ms    QRS Duration 82 ms    Q-T Interval 438 ms    QTc Calculation (Bazett) 444 ms    P Axis 61 degrees    R Axis 2 degrees    T Axis 42 degrees   Comprehensive Metabolic Panel    Collection Time: 08/06/21  2:30 PM   Result Value Ref Range    Glucose 119 (H) 70 - 99 mg/dL    BUN 16 8 - 23 mg/dL    CREATININE 0.70 0.50 - 0.90 mg/dL    Bun/Cre Ratio NOT REPORTED 9 - 20    Calcium 9.5 8.6 - 10.4 mg/dL    Sodium 141 135 - 144 mmol/L    Potassium 3.8 3.7 - 5.3 mmol/L    Chloride 102 98 - 107 mmol/L    CO2 26 20 - 31 mmol/L    Anion Gap 13 9 - 17 mmol/L    Alkaline Phosphatase 75 35 - 104 U/L    ALT 16 5 - 33 U/L    AST 16 <32 U/L    Total Bilirubin 0.40 0.3 - 1.2 mg/dL    Total Protein 7.8 6.4 - 8.3 g/dL    Albumin 4.7 3.5 - 5.2 g/dL    Albumin/Globulin Ratio 1.5 1.0 - 2.5    GFR Non-African American >60 >60 mL/min    GFR African American >60 >60 mL/min    GFR Comment          GFR Staging NOT REPORTED    CBC Auto Differential    Collection Time: 08/06/21  2:30 PM   Result Value Ref Range    WBC 8.2 3.5 - 11.0 k/uL    RBC 4.26 4.0 - 5.2 m/uL    Hemoglobin 14.2 12.0 - 16.0 g/dL    Hematocrit 42.3 36 - 46 %    MCV 99.1 80 - 100 fL    MCH 33.3 26 - 34 pg    MCHC 33.6 31 - 37 g/dL    RDW 13.6 12.5 - 15.4 %    Platelets 479 192 - 111 k/uL    MPV 6.9 6.0 - 12.0 fL    NRBC Automated NOT REPORTED per 100 WBC    Differential Type NOT REPORTED     Seg Neutrophils 83 (H) 36 - 66 %    Lymphocytes 10 (L) 24 - 44 %    Monocytes 5 2 - 11 %    Eosinophils % 1 1 - 4 %    Basophils 1 0 - 2 %    Immature Granulocytes NOT REPORTED 0 %    Segs Absolute 6.90 1.8 - 7.7 k/uL    Absolute Lymph # 0.80 (L) 1.0 - 4.8 k/uL    Absolute Mono # 0.40 0.1 - 1.2 k/uL    Absolute Eos # 0.10 0.0 - 0.4 k/uL    Basophils Absolute 0.00 0.0 - 0.2 k/uL    Absolute Immature Granulocyte NOT REPORTED 0.00 - 0.30 k/uL    WBC Morphology NOT REPORTED     RBC Morphology NOT REPORTED     Platelet Estimate NOT REPORTED    Troponin    Collection Time: 08/06/21  2:30 PM   Result Value Ref Range    Troponin, High Sensitivity 11 0 - 14 ng/L    Troponin T NOT REPORTED <0.03 ng/mL    Troponin Interp NOT REPORTED    Troponin    Collection Time: 08/06/21  3:50 PM   Result Value Ref Range    Troponin, High Sensitivity 10 0 - 14 ng/L    Troponin T NOT REPORTED <0.03 ng/mL    Troponin Interp NOT REPORTED    Urinalysis Reflex to Culture    Collection Time: 08/06/21  7:33 PM    Specimen: Urine, clean catch   Result Value Ref Range    Color, UA YELLOW YELLOW    Turbidity UA CLEAR CLEAR    Glucose, Ur NEGATIVE NEGATIVE    Bilirubin Urine NEGATIVE NEGATIVE    Ketones, Urine SMALL (A) NEGATIVE    Specific Gravity, UA 1.020 1.005 - 1.030    Urine Hgb TRACE (A) NEGATIVE    pH, UA 7.5 5.0 - 8.0    Protein, UA NEGATIVE NEGATIVE    Urobilinogen, Urine Normal Normal    Nitrite, Urine NEGATIVE NEGATIVE    Leukocyte Esterase, Urine NEGATIVE NEGATIVE    Urinalysis Comments NOT REPORTED    Microscopic Urinalysis    Collection Time: 08/06/21  7:33 PM   Result Value Ref Range    -          WBC, UA 0 TO 2 0 - 5 /HPF    RBC, UA 5 TO 10 0 - 2 /HPF    Casts UA NOT REPORTED 0 - 2 /LPF    Crystals, UA NOT REPORTED None /HPF    Epithelial Cells UA 50  0 - 5 /HPF    Renal Epithelial, UA NOT REPORTED 0 /HPF    Bacteria, UA FEW (A) None    Mucus, UA NOT REPORTED None    Trichomonas, UA NOT REPORTED None    Amorphous, UA 2+ (A) None    Other Observations UA (A) NOT REQ. Utilizing a urinalysis as the only screening method to exclude a potential uropathogen can be unreliable in many patient populations. Rapid screening tests are less sensitive than culture and if UTI is a clinical possibility, culture should be considered despite a negative urinalysis. Yeast, UA NOT REPORTED None       Imaging/Diagnostics:  CT Head WO Contrast    Result Date: 8/7/2021  1. No evidence of acute intracranial abnormality. 2. 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 3. Near opacification of left maxillary sinus secondary to presence of large posterior retention cyst.       Assessment :      Hospital Problems         Last Modified POA    * (Principal) Adverse reaction to drug 8/7/2021 Yes    Chronic back pain 8/7/2021 Yes    Weakness 8/7/2021 Yes    Intractable nausea and vomiting 8/7/2021 Yes    Congenital hypothyroidism without goiter 8/7/2021 Yes    Essential hypertension 8/7/2021 Yes          Plan:     Patient status observation in the Med/Surge    1. IV hydration  2. Antiemetics as needed  3. Bladder scan, straight cath if needed  4. GI and DVT prophylaxis  5. Continue home medications  6. Monitor and control blood pressure  7. Activity as tolerated, PT and OT as needed  8.  Discussed with family at bedside    Consultations:   None        Serge Nuñez DO  8/7/2021  11:52 AM    Copy sent to Dr. Bob Stewart MD

## 2021-08-07 NOTE — ED NOTES
[facility] RN and [employer], attempted to sit pt up. Pt has involuntary movement of the head. Pt is showing improvement from previous assessment. Dr. Piedra Pod at bedside to assess pt.       Andres Nunez RN  08/07/21 3130

## 2021-08-07 NOTE — ED NOTES
Pt resting in bed with eyes closed turned down for comfort. No sign of distress noted.       Lina Hurt RN  08/06/21 2020

## 2021-08-07 NOTE — ED NOTES
Called [facility]- RN not available for report . [facility] updated that EMS is en route.        Li Rodgers RN  08/07/21 0569

## 2021-08-07 NOTE — ED PROVIDER NOTES
ADDENDUM:      Care of this patient was assumed from Dr. Anne Calloway. The patient was seen for Medication Reaction (Prialt), Emesis, Nausea, Difficulty Walking, and Chills  . The patient's initial evaluation and plan have been discussed with the prior provider who initially evaluated the patient. Nursing Notes, Past Medical Hx, Past Surgical Hx, Social Hx, Allergies, and Family Hx were all reviewed. Diagnostic Results       RADIOLOGY:   Non-plain film images such as CT, Ultrasound and MRI are read by the radiologist. Gold Cleverly radiographic images are visualized and the radiologist interpretations are reviewed as follows:     CT Head WO Contrast    Result Date: 8/7/2021  EXAMINATION: CT OF THE HEAD WITHOUT CONTRAST  8/7/2021 4:16 am TECHNIQUE: CT of the head was performed without the administration of intravenous contrast. Dose modulation, iterative reconstruction, and/or weight based adjustment of the mA/kV was utilized to reduce the radiation dose to as low as reasonably achievable. COMPARISON: None. HISTORY: ORDERING SYSTEM PROVIDED HISTORY: Unable to stand TECHNOLOGIST PROVIDED HISTORY: Unable to stand Decision Support Exception - unselect if not a suspected or confirmed emergency medical condition->Emergency Medical Condition (MA) Reason for Exam: Pt unable to stand. Medication Reaction Acuity: Acute Type of Exam: Initial FINDINGS: BRAIN/VENTRICLES: There is no acute intracranial hemorrhage, mass effect or midline shift. No abnormal extra-axial fluid collection. The gray-white differentiation is maintained without evidence of an acute infarct. There is no evidence of hydrocephalus. Posterior left occipital lobe volume loss and encephalomalacia is seen. ORBITS: The visualized portion of the orbits demonstrate no acute abnormality.  SINUSES: Near opacification of the left maxillary sinus is present secondary to large posterior retention cyst.  The visualized paranasal sinuses and mastoid air cells demonstrate no acute abnormality. SOFT TISSUES/SKULL:  No acute abnormality of the visualized skull or soft tissues. 1. No evidence of acute intracranial abnormality. 2. 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  3. Near opacification of left maxillary sinus secondary to presence of large posterior retention cyst.     LABS:   Results for orders placed or performed during the hospital encounter of 08/06/21   Comprehensive Metabolic Panel   Result Value Ref Range    Glucose 119 (H) 70 - 99 mg/dL    BUN 16 8 - 23 mg/dL    CREATININE 0.70 0.50 - 0.90 mg/dL    Bun/Cre Ratio NOT REPORTED 9 - 20    Calcium 9.5 8.6 - 10.4 mg/dL    Sodium 141 135 - 144 mmol/L    Potassium 3.8 3.7 - 5.3 mmol/L    Chloride 102 98 - 107 mmol/L    CO2 26 20 - 31 mmol/L    Anion Gap 13 9 - 17 mmol/L    Alkaline Phosphatase 75 35 - 104 U/L    ALT 16 5 - 33 U/L    AST 16 <32 U/L    Total Bilirubin 0.40 0.3 - 1.2 mg/dL    Total Protein 7.8 6.4 - 8.3 g/dL    Albumin 4.7 3.5 - 5.2 g/dL    Albumin/Globulin Ratio 1.5 1.0 - 2.5    GFR Non-African American >60 >60 mL/min    GFR African American >60 >60 mL/min    GFR Comment          GFR Staging NOT REPORTED    CBC Auto Differential   Result Value Ref Range    WBC 8.2 3.5 - 11.0 k/uL    RBC 4.26 4.0 - 5.2 m/uL    Hemoglobin 14.2 12.0 - 16.0 g/dL    Hematocrit 42.3 36 - 46 %    MCV 99.1 80 - 100 fL    MCH 33.3 26 - 34 pg    MCHC 33.6 31 - 37 g/dL    RDW 13.6 12.5 - 15.4 %    Platelets 669 955 - 185 k/uL    MPV 6.9 6.0 - 12.0 fL    NRBC Automated NOT REPORTED per 100 WBC    Differential Type NOT REPORTED     Seg Neutrophils 83 (H) 36 - 66 %    Lymphocytes 10 (L) 24 - 44 %    Monocytes 5 2 - 11 %    Eosinophils % 1 1 - 4 %    Basophils 1 0 - 2 %    Immature Granulocytes NOT REPORTED 0 %    Segs Absolute 6.90 1.8 - 7.7 k/uL    Absolute Lymph # 0.80 (L) 1.0 - 4.8 k/uL    Absolute Mono # 0.40 0.1 - 1.2 k/uL    Absolute Eos # 0.10 0.0 - 0.4 k/uL    Basophils Absolute 0.00 0.0 - 0.2 k/uL    Absolute Immature Granulocyte NOT REPORTED 0.00 - 0.30 k/uL    WBC Morphology NOT REPORTED     RBC Morphology NOT REPORTED     Platelet Estimate NOT REPORTED    Troponin   Result Value Ref Range    Troponin, High Sensitivity 11 0 - 14 ng/L    Troponin T NOT REPORTED <0.03 ng/mL    Troponin Interp NOT REPORTED    Troponin   Result Value Ref Range    Troponin, High Sensitivity 10 0 - 14 ng/L    Troponin T NOT REPORTED <0.03 ng/mL    Troponin Interp NOT REPORTED    Urinalysis Reflex to Culture    Specimen: Urine, clean catch   Result Value Ref Range    Color, UA YELLOW YELLOW    Turbidity UA CLEAR CLEAR    Glucose, Ur NEGATIVE NEGATIVE    Bilirubin Urine NEGATIVE NEGATIVE    Ketones, Urine SMALL (A) NEGATIVE    Specific Gravity, UA 1.020 1.005 - 1.030    Urine Hgb TRACE (A) NEGATIVE    pH, UA 7.5 5.0 - 8.0    Protein, UA NEGATIVE NEGATIVE    Urobilinogen, Urine Normal Normal    Nitrite, Urine NEGATIVE NEGATIVE    Leukocyte Esterase, Urine NEGATIVE NEGATIVE    Urinalysis Comments NOT REPORTED    Microscopic Urinalysis   Result Value Ref Range    -          WBC, UA 0 TO 2 0 - 5 /HPF    RBC, UA 5 TO 10 0 - 2 /HPF    Casts UA NOT REPORTED 0 - 2 /LPF    Crystals, UA NOT REPORTED None /HPF    Epithelial Cells UA 50  0 - 5 /HPF    Renal Epithelial, UA NOT REPORTED 0 /HPF    Bacteria, UA FEW (A) None    Mucus, UA NOT REPORTED None    Trichomonas, UA NOT REPORTED None    Amorphous, UA 2+ (A) None    Other Observations UA (A) NOT REQ. Utilizing a urinalysis as the only screening method to exclude a potential uropathogen can be unreliable in many patient populations. Rapid screening tests are less sensitive than culture and if UTI is a clinical possibility, culture should be considered despite a negative urinalysis.     Yeast, UA NOT REPORTED None   EKG 12 Lead   Result Value Ref Range    Ventricular Rate 62 BPM    Atrial Rate 62 BPM    P-R Interval 194 ms    QRS Duration 82 ms    Q-T Interval 438 ms    QTc Calculation (Bazett) 444 ms    P Axis 61 degrees    R Axis 2 degrees    T Axis 42 degrees       RECENT VITALS:  BP: (!) 160/57, Temp: 98.7 °F (37.1 °C), Pulse: 62, Resp: 16     ED Course     The patient was given the following medications:  Orders Placed This Encounter   Medications    0.9 % sodium chloride bolus    ondansetron (ZOFRAN) injection 4 mg    meclizine (ANTIVERT) tablet 25 mg    0.9 % sodium chloride bolus    promethazine (PHENERGAN) injection 12.5 mg    0.9 % sodium chloride infusion     During the emergency department course, patient was given normal saline 2 L bolus followed by infusion as well as Zofran 4 mg IV push, Antivert 25 mg orally and Phenergan 12.5 mg IV push. Her nausea and vomiting are resolved and she is able to tolerate oral fluids. Medical Decision Making      Patient presented with possible medication reaction after she received intrathecal Prialt (Ziconotide) yesterday morning. She has had similar reaction about 8 to 9 months ago but symptoms were not as long-lasting as the current ones. She presented with nausea, vomiting, generalized weakness, chills and inability to ambulate. She denies any headache, tingling or numbness in any of the extremities. She also denies any chest pain, shortness of breath, palpitations or diaphoresis. She has chronic back pain and follows up at pain clinic. Upon reevaluation, patient is resting comfortably and does not appear to be in any pain or distress. Her lungs are clear to auscultation. Rhythm is regular without murmurs. Abdomen is soft and nontender with active bowel sounds. Extremities without any edema or calf tenderness. Patient is able to move all 4 extremities and there are no focal neurological deficits but she is unable to get up and ambulate even with assistance. Since her medication side effects are not resolving, plan is to admit the patient for observation on medical floor.   I discussed the case with Charu Floyd CNP covering for hospitalist group and she kindly accepted the patient to be admitted on medical floor. We are awaiting bed assignment and then transfer arrangements will be made. Disposition     FINAL IMPRESSION      1. General weakness    2. Non-intractable vomiting with nausea, unspecified vomiting type    3. Unable to ambulate          DISPOSITION/PLAN   DISPOSITION Decision To Admit 08/07/2021 05:42:49 AM      PATIENT REFERRED TO:  Tigre Pitts MD  [phone]  [place] [address]     Call in 1 day  For reevaluation of current symptoms    [facility] ED  / [place] [address]  [phone]    If symptoms worsen      DISCHARGE MEDICATIONS:  New Prescriptions    No medications on file             (Please note that portions of this note were completed with a voice recognition program.  Efforts were made to edit the dictations but occasionally words are mis-transcribed.)    Denita Ding MD,, MD, F.A.C.E.P.   Attending Emergency Medicine Physician               Denita Ding MD  08/07/21 0252

## 2021-08-07 NOTE — ED NOTES
RN attempted to have pt ambulate in the hallway to assess her gait. Pt states she is too weak to walk. Pt is able to lift leg up from the bed. Pt  states that she is coming around, but states he is concerned about taking her home if she is unable to walk. Pt  states pt has chronic back pain, and normally walks with a cane at home with an unsteady gait. Pt  states he does not want to take her home if she can't walk because he is afraid he will not be able to assist her to the bathroom with incident.       Brittnee Echeverria RN  08/06/21 8197

## 2021-08-07 NOTE — ED NOTES
Facesheet and necessity form faxed to [facility] [address].       Xiomy Lo RN  08/07/21 8841

## 2021-08-08 PROBLEM — R33.9 URINARY RETENTION: Status: ACTIVE | Noted: 2021-08-08

## 2021-08-08 PROBLEM — T50.905A ADVERSE REACTION TO DRUG, INITIAL ENCOUNTER: Status: ACTIVE | Noted: 2021-08-08

## 2021-08-08 LAB
ANION GAP SERPL CALCULATED.3IONS-SCNC: 11 MMOL/L (ref 9–17)
BUN BLDV-MCNC: 11 MG/DL (ref 8–23)
BUN/CREAT BLD: 19 (ref 9–20)
CALCIUM SERPL-MCNC: 8.8 MG/DL (ref 8.6–10.4)
CHLORIDE BLD-SCNC: 107 MMOL/L (ref 98–107)
CO2: 23 MMOL/L (ref 20–31)
CREAT SERPL-MCNC: 0.59 MG/DL (ref 0.5–0.9)
GFR AFRICAN AMERICAN: >60 ML/MIN
GFR NON-AFRICAN AMERICAN: >60 ML/MIN
GFR SERPL CREATININE-BSD FRML MDRD: ABNORMAL ML/MIN/{1.73_M2}
GFR SERPL CREATININE-BSD FRML MDRD: ABNORMAL ML/MIN/{1.73_M2}
GLUCOSE BLD-MCNC: 96 MG/DL (ref 70–99)
HCT VFR BLD CALC: 39.8 % (ref 36.3–47.1)
HEMOGLOBIN: 12.9 G/DL (ref 11.9–15.1)
INR BLD: 0.9
MAGNESIUM: 2.2 MG/DL (ref 1.6–2.6)
MCH RBC QN AUTO: 32.6 PG (ref 25.2–33.5)
MCHC RBC AUTO-ENTMCNC: 32.4 G/DL (ref 28.4–34.8)
MCV RBC AUTO: 100.5 FL (ref 82.6–102.9)
NRBC AUTOMATED: 0 PER 100 WBC
PDW BLD-RTO: 13.2 % (ref 11.8–14.4)
PLATELET # BLD: 341 K/UL (ref 138–453)
PMV BLD AUTO: 9.2 FL (ref 8.1–13.5)
POTASSIUM SERPL-SCNC: 3.4 MMOL/L (ref 3.7–5.3)
PROTHROMBIN TIME: 12.5 SEC (ref 11.5–14.2)
RBC # BLD: 3.96 M/UL (ref 3.95–5.11)
SODIUM BLD-SCNC: 141 MMOL/L (ref 135–144)
WBC # BLD: 6.8 K/UL (ref 3.5–11.3)

## 2021-08-08 PROCEDURE — 85610 PROTHROMBIN TIME: CPT

## 2021-08-08 PROCEDURE — 96372 THER/PROPH/DIAG INJ SC/IM: CPT

## 2021-08-08 PROCEDURE — 97110 THERAPEUTIC EXERCISES: CPT

## 2021-08-08 PROCEDURE — 36415 COLL VENOUS BLD VENIPUNCTURE: CPT

## 2021-08-08 PROCEDURE — 2060000000 HC ICU INTERMEDIATE R&B

## 2021-08-08 PROCEDURE — 97161 PT EVAL LOW COMPLEX 20 MIN: CPT

## 2021-08-08 PROCEDURE — 6370000000 HC RX 637 (ALT 250 FOR IP): Performed by: NURSE PRACTITIONER

## 2021-08-08 PROCEDURE — 83735 ASSAY OF MAGNESIUM: CPT

## 2021-08-08 PROCEDURE — 6360000002 HC RX W HCPCS: Performed by: NURSE PRACTITIONER

## 2021-08-08 PROCEDURE — 99232 SBSQ HOSP IP/OBS MODERATE 35: CPT | Performed by: INTERNAL MEDICINE

## 2021-08-08 PROCEDURE — 85027 COMPLETE CBC AUTOMATED: CPT

## 2021-08-08 PROCEDURE — 80048 BASIC METABOLIC PNL TOTAL CA: CPT

## 2021-08-08 PROCEDURE — 2580000003 HC RX 258: Performed by: NURSE PRACTITIONER

## 2021-08-08 RX ORDER — METOPROLOL SUCCINATE 25 MG/1
25 TABLET, EXTENDED RELEASE ORAL ONCE
Status: COMPLETED | OUTPATIENT
Start: 2021-08-08 | End: 2021-08-08

## 2021-08-08 RX ORDER — METOPROLOL SUCCINATE 25 MG/1
25 TABLET, EXTENDED RELEASE ORAL ONCE
Status: DISCONTINUED | OUTPATIENT
Start: 2021-08-08 | End: 2021-08-08

## 2021-08-08 RX ADMIN — GABAPENTIN 300 MG: 300 CAPSULE ORAL at 07:40

## 2021-08-08 RX ADMIN — SODIUM CHLORIDE: 9 INJECTION, SOLUTION INTRAVENOUS at 11:26

## 2021-08-08 RX ADMIN — GABAPENTIN 300 MG: 300 CAPSULE ORAL at 13:58

## 2021-08-08 RX ADMIN — POTASSIUM CHLORIDE 40 MEQ: 20 TABLET, EXTENDED RELEASE ORAL at 13:30

## 2021-08-08 RX ADMIN — METOPROLOL SUCCINATE 25 MG: 25 TABLET, EXTENDED RELEASE ORAL at 22:01

## 2021-08-08 RX ADMIN — LISINOPRIL 20 MG: 20 TABLET ORAL at 07:39

## 2021-08-08 RX ADMIN — SODIUM CHLORIDE, PRESERVATIVE FREE 10 ML: 5 INJECTION INTRAVENOUS at 07:37

## 2021-08-08 RX ADMIN — LEVOTHYROXINE SODIUM 50 MCG: 0.05 TABLET ORAL at 07:39

## 2021-08-08 RX ADMIN — SODIUM CHLORIDE, PRESERVATIVE FREE 10 ML: 5 INJECTION INTRAVENOUS at 21:43

## 2021-08-08 RX ADMIN — ENOXAPARIN SODIUM 40 MG: 40 INJECTION SUBCUTANEOUS at 07:37

## 2021-08-08 RX ADMIN — LEVOTHYROXINE AND LIOTHYRONINE 60 MG: 38; 9 TABLET ORAL at 07:40

## 2021-08-08 RX ADMIN — GABAPENTIN 300 MG: 300 CAPSULE ORAL at 21:42

## 2021-08-08 ASSESSMENT — PAIN SCALES - GENERAL
PAINLEVEL_OUTOF10: 0
PAINLEVEL_OUTOF10: 0

## 2021-08-08 NOTE — PLAN OF CARE
Problem: Falls - Risk of:  Goal: Will remain free from falls  Description: Will remain free from falls  8/8/2021 0933 by Iqra Valderrama RN  Outcome: Ongoing  8/8/2021 0930 by Iqra Valderrama RN  Outcome: Ongoing  8/8/2021 0419 by Rosita Brunner RN  Outcome: Ongoing  Goal: Absence of physical injury  Description: Absence of physical injury  8/8/2021 0933 by Iqra Valderrama RN  Outcome: Ongoing  8/8/2021 0930 by Iqra Valderrama RN  Outcome: Ongoing  8/8/2021 0419 by Rosita Brunner RN  Outcome: Ongoing     Problem: Pain:  Goal: Pain level will decrease  Description: Pain level will decrease  Outcome: Ongoing  Goal: Control of acute pain  Description: Control of acute pain  Outcome: Ongoing  Goal: Control of chronic pain  Description: Control of chronic pain  Outcome: Ongoing     Problem: Infection:  Goal: Will remain free from infection  Description: Will remain free from infection  Outcome: Ongoing     Problem: Safety:  Goal: Free from accidental physical injury  Description: Free from accidental physical injury  Outcome: Ongoing     Problem: Daily Care:  Goal: Daily care needs are met  Description: Daily care needs are met  Outcome: Ongoing

## 2021-08-08 NOTE — PROGRESS NOTES
[facility]  Office: [address], DO, Larry Shima, DO, Conner Hubbard, DO, Flavio Lighting Blood, DO, Laney Conte MD, Geoffrey Doan MD, Neto Alfred MD, Bossman Warren MD, Denis Allen MD, Godwin Hummel MD, Thea Alejandre MD, Mikey Clayton, DO, Yolanda Vincent MD, Linda Smith, DO, Rafa Kilgore MD,  Nilton Chu DO, Keri Neville MD, Jarvis Ragsdale MD, Jody Amanda MD, Sarabjit Duque MD, Simon Gutierrez MD, Ameena Thakkar MD, Cece Merchant MD, Gumaro Quintanilla, [facility], [facility], CNP, Danica Browning, CNP, Laurence Ragsdale, CNS, Astrid Soriano, CNP, Leora Tenorio, CNP, Rubén Dowell, CNP, Stephanie Sultana, CNP, Pio Roy, CNP, Catheryn Leventhal, PA-C, Miriam Duenas, [facility], Usman Suárez, CNP, Dinh Del Castillo, CNP, Neal Zhang, CNP, Lisa Gutierrez, CNP, Hpoe Robertson, CNP, Wilfredo Arevalo, CNP, Maegan Lopez, [facility], [facility]    Progress Note    8/8/2021    9:12 AM    Name:   Risa Crump  MRN:     [id number]     Acct:      [de-identified]   Room:   1016/1016-02   Day:  0  Admit Date:  8/6/2021  1:47 PM    PCP:   Sherman Linda MD  Code Status:  Full Code    Subjective:     C/C:   Chief Complaint   Patient presents with   Adrianneie Cowden Medication Reaction     Prialt    Emesis    Nausea    Difficulty Walking    Chills     Interval History Status: improved. Improving strength and ambulating better. No further nausea or vomiting or chills. Still not back to baseline. Denies any chest pain, shortness of breath, abdominal pain or other acute complaints. Brief History: This is a 75-year-old female with a history of chronic back pain that follows with Dr. Phoenix for pain management. She presented shortly after having a Prialt injection for her chronic pain where she develops severe intractable nausea and vomiting, and weakness and difficulty ambulating and chills.   Upon evaluation here she is found to have evidence of bladder distention with urinary retention requiring Arreguin catheter placement. She has similar reaction with a prior Prialt injection which was felt to possibly inject in the wrong area and subsequently consented for the repeat injection. Review of Systems:     Constitutional:  negative for chills, fevers, sweats  Respiratory:  negative for cough, dyspnea on exertion, shortness of breath, wheezing  Cardiovascular:  negative for chest pain, chest pressure/discomfort, lower extremity edema, palpitations  Gastrointestinal:  negative for abdominal pain, constipation, diarrhea, nausea, vomiting  Neurological:  negative for dizziness, headache    Medications: Allergies: Allergies   Allergen Reactions    Ziconotide Acetate Nausea And Vomiting and Other (See Comments)     Difficulty walking, blurred vision, n/v    Sulfa Antibiotics Nausea And Vomiting       Current Meds:   Scheduled Meds:    metoprolol succinate  75 mg Oral Once    enoxaparin  40 mg Subcutaneous Daily    sodium chloride flush  5-40 mL Intravenous 2 times per day    Cholecalciferol  2,000 Units Oral Daily    lisinopril  20 mg Oral Daily    metoprolol succinate  75 mg Oral Nightly    gabapentin  300 mg Oral TID    levothyroxine  50 mcg Oral Daily    thyroid  60 mg Oral Daily     Continuous Infusions:    sodium chloride      sodium chloride 75 mL/hr at 08/07/21 2057     PRN Meds: sodium chloride, acetaminophen **OR** acetaminophen, magnesium sulfate, ondansetron **OR** ondansetron, polyethylene glycol, potassium chloride **OR** potassium alternative oral replacement **OR** potassium chloride, sodium chloride flush, traMADol    Data:     Past Medical History:   has a past medical history of Cancer ([address] Chronic back pain, Hyperthyroidism, Obstructive sleep apnea syndrome, and Urge incontinence. Social History:   reports that she has never smoked. She has never used smokeless tobacco. She reports current alcohol use. She reports that she does not use drugs. Family History:   Family History   Problem Relation Age of Onset    Cancer Mother     Heart Disease Father     Stroke Father     Heart Disease Sister     Stroke Sister     Breast Cancer Maternal Grandmother        Vitals:  BP (!) 178/69   Pulse 66   Temp 98.2 °F (36.8 °C) (Oral)   Resp 18   Ht 5' 5\" (1.651 m)   Wt 142 lb (64.4 kg)   SpO2 100%   BMI 23.63 kg/m²   Temp (24hrs), Av.2 °F (36.8 °C), Min:98.1 °F (36.7 °C), Max:98.6 °F (37 °C)    No results for input(s): POCGLU in the last 72 hours. I/O (24Hr): Intake/Output Summary (Last 24 hours) at 2021 0912  Last data filed at 2021 0749  Gross per 24 hour   Intake 3105.84 ml   Output 3975 ml   Net -869.16 ml       Labs:  Hematology:  Recent Labs     21  1430 21  0514   WBC 8.2 6.8   RBC 4.26 3.96   HGB 14.2 12.9   HCT 42.3 39.8   MCV 99.1 100.5   MCH 33.3 32.6   MCHC 33.6 32.4   RDW 13.6 13.2    341   MPV 6.9 9.2   INR  --  0.9     Chemistry:  Recent Labs     21  1430 21  1550 21  0514     --  141   K 3.8  --  3.4*     --  107   CO2 26  --  23   GLUCOSE 119*  --  96   BUN 16  --  11   CREATININE 0.70  --  0.59   MG  --   --  2.2   ANIONGAP 13  --  11   LABGLOM >60  --  >60   GFRAA >60  --  >60   CALCIUM 9.5  --  8.8   TROPHS 11 10  --      Recent Labs     21  1430   PROT 7.8   LABALBU 4.7   AST 16   ALT 16   ALKPHOS 75   BILITOT 0.40     ABG:No results found for: POCPH, PHART, PH, POCPCO2, KFP9PUA, PCO2, POCPO2, PO2ART, PO2, POCHCO3, ATL0CCJ, HCO3, NBEA, PBEA, BEART, BE, THGBART, THB, ZRY3TLY, BMVT4PWS, M0XKZMCY, O2SAT, FIO2  No results found for: SPECIAL  No results found for: CULTURE    Radiology:  CT Head WO Contrast    Result Date: 2021  1. No evidence of acute intracranial abnormality. 2. 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  3. Near opacification of left maxillary sinus secondary to presence of large posterior retention cyst.       Physical Examination:        General appearance:  alert, cooperative and no distress  Mental Status:  oriented to person, place and time and normal affect  Lungs:  clear to auscultation bilaterally, normal effort  Heart:  regular rate and rhythm, no murmur  Abdomen:  soft, nontender, nondistended, normal bowel sounds, no masses, hepatomegaly, splenomegaly  Extremities:  no edema, redness, tenderness in the calves  Skin:  no gross lesions, rashes, induration    Assessment:        Hospital Problems         Last Modified POA    * (Principal) Adverse reaction to drug 8/7/2021 Yes    Chronic back pain 8/7/2021 Yes    Weakness 8/7/2021 Yes    Intractable nausea and vomiting 8/7/2021 Yes    Congenital hypothyroidism without goiter 8/7/2021 Yes    Essential hypertension 8/7/2021 Yes    Urinary retention 8/8/2021 Yes          Plan:        1. Continue IV hydration  2. Arreguin catheter until ambulating better then void trial  3. Continue home medications  4. Monitoring control blood pressure  5.  PT and OT  6. GI and DVT prophylaxis    Nicole Tena DO  8/8/2021  9:12 AM

## 2021-08-08 NOTE — DISCHARGE INSTR - COC
Continuity of Care Form    Patient Name: Kimberly Schroeder   :  1945  MRN:  [id number]    Admit date:  2021  Discharge date:  8/10/21    Code Status Order: Full Code   Advance Directives:      Admitting Physician:  Marisol Hoskisn MD  PCP: Jackie Carey MD    Discharging Nurse: [address] Unit/Room#: 6427/1743-35  Discharging Unit Phone Number: [phone]    Emergency Contact:   Extended Emergency Contact Information  Primary Emergency Contact: [facility] [address] of [address] [place] [address] Phone: [phone]  Relation: Spouse  Secondary Emergency Contact: Marysol Whitfield [place] of [address] [place] [address] Phone: [phone]  Relation: Child    Past Surgical History:  Past Surgical History:   Procedure Laterality Date    ABDOMINOPLASTY      BREAST SURGERY Left 10/2012    [address] [place] [address], 2008    has had 3 back surgeries       Immunization History: There is no immunization history on file for this patient.     Active Problems:  Patient Active Problem List   Diagnosis Code    Malignant neoplasm of overlapping sites of right breast in female, estrogen receptor negative ([facility] C50.811, Z17.1    Chronic back pain M54.9, G89.29    Myofascial pain M79.18    Bilateral low back pain without sciatica M54.5    Weakness R53.1    Adverse reaction to drug T50.905A    Intractable nausea and vomiting R11.2    Congenital hypothyroidism without goiter E03.1    Essential hypertension I10    Urinary retention R33.9       Isolation/Infection:   Isolation            No Isolation          Patient Infection Status       None to display            Nurse Assessment:  Last Vital Signs: BP (!) 178/69   Pulse 66   Temp 98.2 °F (36.8 °C) (Oral)   Resp 18   Ht 5' 5\" (1.651 m)   Wt 142 lb (64.4 kg)   SpO2 100%   BMI 23.63 kg/m²     Last documented pain score (0-10 scale): Pain Level: 0  Last Weight:   Wt Readings from Last 1 Encounters:   21 142 lb (64.4 kg)     Mental Status:  oriented and alert    IV Access:  - None    Nursing Mobility/ADLs:  Walking   Assisted  Transfer  Assisted  Bathing  Assisted  Dressing  Independent  Toileting  Assisted  Feeding  [address]  Assisted  Med Delivery   whole    Wound Care Documentation and Therapy:        Elimination:  Continence: Bowel: Yes and No  Bladder: Yes  Urinary Catheter: None   Colostomy/Ileostomy/Ileal Conduit: No       Date of Last BM: 8/9/21    Intake/Output Summary (Last 24 hours) at 8/8/2021 1218  Last data filed at 8/8/2021 0954  Gross per 24 hour   Intake 2755.84 ml   Output 3725 ml   Net -969.16 ml     I/O last 3 completed shifts: In: 2024 [P.O.:550; I.V.:1474]  Out: 1447 [Urine:3435]    Safety Concerns:     None    Impairments/Disabilities:      None    Nutrition Therapy:  Current Nutrition Therapy:   - Oral Diet:  General    Routes of Feeding: Oral  Liquids: No Restrictions  Daily Fluid Restriction: no  Last Modified Barium Swallow with Video (Video Swallowing Test): not done    Treatments at the Time of Hospital Discharge:   Respiratory Treatments: see orders  Oxygen Therapy:  is not on home oxygen therapy.   Ventilator:    - No ventilator support    Rehab Therapies: Physical Therapy and Occupational Therapy  Weight Bearing Status/Restrictions: No weight bearing restirctions  Other Medical Equipment (for information only, NOT a DME order):  walker  Other Treatments:   1) SN for Assessment  2) SN for Medication Teaching & Compliance    Patient's personal belongings (please select all that are sent with patient):  None    RN SIGNATURE:  Electronically signed by Marcy Benavides RN on 8/10/21 at 11:58 AM EDT    CASE MANAGEMENT/SOCIAL WORK SECTION    Inpatient Status Date: 8/8/21    Readmission Risk Assessment Score:  Readmission Risk              Risk of Unplanned Readmission:  0           [facility]   [facility]  Address:  Phone: [phone]  Fax: [phone]      / signature: Electronically signed by Sofie Mae RN on 8/9/21 at 1:23 PM EDT    PHYSICIAN SECTION    Prognosis: Fair    Condition at Discharge: Stable    Rehab Potential (if transferring to Rehab): Fair    Recommended Labs or Other Treatments After Discharge:   Pain management  Follow-up with Dr. Gregg Hunter as scheduled  PT/OT  Blood Pressure - Monitor and control  Observe for urine retention  The patient was instructed to follow up with their PCP, Rajani Weinstein MD in one week     Physician Certification: I certify the above information and transfer of Krys Rojas  is necessary for the continuing treatment of the diagnosis listed and that she requires Home Care for less 30 days. Update Admission H&P:   Principal Problem: Adverse reaction to drug, Prialt  Active Problems:    Chronic back pain    Weakness    Intractable nausea and vomiting    Congenital hypothyroidism without goiter    Essential hypertension    Urinary retention    Adverse reaction to drug, initial encounter    Hypokalemia    Presence of implanted infusion pump, pain pump in place    Labile blood pressure  Resolved Problems:    * No resolved hospital problems.  *     PHYSICIAN SIGNATURE:  Electronically signed by Kenji Muller DO on 8/10/21 at 12:23 PM EDT

## 2021-08-08 NOTE — PROGRESS NOTES
Physical Therapy    Facility/Department: [facility] PROGRESSIVE CARE  Initial Assessment    NAME: Edward Thomason  : 1945  MRN: [id number]    Date of Service: 2021    Discharge Recommendations:Due to recent hospitalization and medical condition, pt would benefit from additional therapy at time of discharge to ensure safety. Please refer to the AM-PAC score for current functional status. Home with Home health PT   PT Equipment Recommendations  Equipment Needed: No    Assessment   Body structures, Functions, Activity limitations: Decreased functional mobility ; Decreased ADL status; Decreased endurance;Decreased strength;Decreased balance  Prognosis: Good  Decision Making: Medium Complexity  PT Education: Goals;Transfer Training;PT Role;Functional Mobility Training;Plan of Care;General Safety;Home Exercise Program;Adaptive Device Training;Gait Training  REQUIRES PT FOLLOW UP: Yes  Activity Tolerance  Activity Tolerance: Patient Tolerated treatment well;Patient limited by fatigue;Patient limited by endurance  Activity Tolerance: pt was very pleased with how well she did today as she was unable to sit on EOB without shaking       Patient Diagnosis(es): The primary encounter diagnosis was General weakness. Diagnoses of Non-intractable vomiting with nausea, unspecified vomiting type and Unable to ambulate were also pertinent to this visit. has a past medical history of Cancer ([facility] Chronic back pain, Hyperthyroidism, Obstructive sleep apnea syndrome, and Urge incontinence. has a past surgical history that includes Spine surgery (, , ); Breast surgery (Left, 10/2012); and Abdominoplasty.     Restrictions     Vision/Hearing  Vision: Impaired  Vision Exceptions: Wears glasses for distance  Hearing: Within functional limits     Subjective  General  Chart Reviewed: Yes  Patient assessed for rehabilitation services?: Yes  Response To Previous Treatment: Patient with no complaints from previous session.   Family / Caregiver Present: Yes ( was present)  Follows Commands: Within Functional Limits  General Comment  Comments: [facility] RN cleared for PT  Subjective  Subjective: Pt was agreeable to PT  Pain Screening  Patient Currently in Pain: No  Vital Signs  Patient Currently in Pain: No       Orientation  Orientation  Overall Orientation Status: Within Normal Limits  Social/Functional History  Social/Functional History  Lives With: Spouse  Type of Home: House  Home Layout: Performs ADL's on one level, Able to Live on Main level with bedroom/bathroom, Two level  Home Access: Stairs to enter without rails  Entrance Stairs - Number of Steps: 2 steps up from garage and she does ok with steps  Bathroom Shower/Tub: Walk-in shower  Bathroom Toilet: Handicap height  Bathroom Equipment: Built-in shower seat  Home Equipment: Cane, Rolling walker  Receives Help From: Family  ADL Assistance: Independent  Homemaking Assistance: Independent (her  helps)  Homemaking Responsibilities: Yes  Ambulation Assistance: Independent (she has been using a cane for the past 6 years)  Transfer Assistance: Independent  Active : Yes  Mode of Transportation: Car  Occupation: Retired  Type of occupation: , sales  Leisure & Hobbies: cooking, work out at [employer]        Objective     Observation/Palpation  Posture: Good    PROM RLE (degrees)  RLE PROM: WNL  AROM RLE (degrees)  RLE AROM: WNL  PROM LLE (degrees)  LLE PROM: WNL  AROM LLE (degrees)  LLE AROM : WNL  PROM RUE (degrees)  RUE PROM: WNL  AROM RUE (degrees)  RUE AROM : WNL  PROM LUE (degrees)  LUE PROM: WNL  AROM LUE (degrees)  LUE AROM : WNL  Strength RLE  Strength RLE: WFL  Strength LLE  Strength LLE: WFL  Strength RUE  Strength RUE: WFL  Strength LUE  Strength LUE: WFL  Tone RLE  RLE Tone: Normotonic  Tone LLE  LLE Tone: Normotonic  Motor Control  Gross Motor?: WNL     Bed mobility  Bridging: Stand by assistance  Rolling to Left: Stand by assistance  Rolling to Right: Stand by assistance  Supine to Sit: Stand by assistance  Sit to Supine: Stand by assistance  Scooting: Stand by assistance  Transfers  Sit to Stand: Contact guard assistance  Stand to sit: Contact guard assistance  Ambulation  Ambulation?: Yes  More Ambulation?: No  Ambulation 1  Surface: level tile  Device: Rolling Walker  Assistance: Minimal assistance  Quality of Gait: side shuffled to sit for a better position in bed  Gait Deviations: Slow Ashley;Decreased step length;Decreased step height     Balance  Posture: Good  Sitting - Static: Good  Sitting - Dynamic: Good  Standing - Static: Good  Standing - Dynamic: Good  Exercises  Hip Flexion: x10  Hip Abduction: x10  Other exercises  Other exercises?: Yes  Other exercises 1: x10 partial squats, 3 way hip, calf raises     Plan   Plan  Times per week: 1-2x/day; 5-6x/wk  Times per day: Daily  Plan weeks: 12 visits  Current Treatment Recommendations: Strengthening, Transfer Training, Endurance Training, Patient/Caregiver Education & Training, Gait Training, Home Exercise Program, Functional Mobility Training, Stair training, Safety Education & Training  Safety Devices  Type of devices:  All fall risk precautions in place, Patient at risk for falls, Left in bed, Bed alarm in place, Call light within reach, Gait belt  Restraints  Initially in place: No    G-Code       OutComes Score                                                  AM-PAC Score  AM-PAC Inpatient Mobility Raw Score : 13 (08/08/21 1255)  AM-PAC Inpatient T-Scale Score : 36.74 (08/08/21 1255)  Mobility Inpatient CMS 0-100% Score: 64.91 (08/08/21 1255)  Mobility Inpatient CMS G-Code Modifier : CL (08/08/21 1255)          Goals  Short term goals  Time Frame for Short term goals: 12 visits  Short term goal 1: to be indepenent with ambulation with RW for 100'  Short term goal 2: to be independent with all transfers  Short term goal 3: to be independent with HEP  Patient Goals Patient goals : to be able to return to working out       Therapy Time   Individual Concurrent Group Co-treatment   Time In [address] [place] [address]                 Feliz Bernstein PT

## 2021-08-08 NOTE — CARE COORDINATION
Case Management Initial Discharge Plan  Ilia Schultz Risk              Risk of Unplanned Readmission:  0             Met with:patient to discuss discharge plans. Information verified: address, contacts, phone number, , insurance Yes  PCP: Swapnil Wong MD  Date of last visit: 2 weeks ago    Insurance Provider: medicare and [facility]    Discharge Planning  Current Residence:  [address] [place] [address]   Living Arrangements:  Spouse/Significant Other   Home has1  stories/2 stairs to climb  Support Systems:  Spouse/Significant Other, Children  Current Services PTA:   Agency:   Patient able to perform ADL's:Independent  DME in home:  Walker, cane  DME used to aid ambulation prior to admission:     DME used during admission:      Potential Assistance Needed:  N/A    Pharmacy:    Potential Assistance Purchasing Medications:  No  Does patient want to participate in local refill/ meds to beds program?  Not Assessed    Patient agreeable to home care: Yes  Freedom of choice provided:  yes      Type of Home Care Services:  None  Patient expects to be discharged to:       Prior SNF/Rehab Placement and Facility: none  Agreeable to SNF/Rehab: tbd  [place] of choice provided: yes   Evaluation: yes    Expected Discharge date:  21  Follow Up Appointment: Best Day/ Time:      Transportation provider: self or family  Transportation arrangements needed for discharge: tbd    Discharge Plan: Met with patient  at bedside to complete discharge assessment. She lives with spouse in 1 story condo. Drives self. Follow PT/OT evaluation. Potential HC.            Electronically signed by VIK Bell on 21 at 12:15 PM EDT

## 2021-08-09 PROCEDURE — 51701 INSERT BLADDER CATHETER: CPT

## 2021-08-09 PROCEDURE — 2580000003 HC RX 258: Performed by: NURSE PRACTITIONER

## 2021-08-09 PROCEDURE — 97116 GAIT TRAINING THERAPY: CPT

## 2021-08-09 PROCEDURE — 99232 SBSQ HOSP IP/OBS MODERATE 35: CPT | Performed by: INTERNAL MEDICINE

## 2021-08-09 PROCEDURE — 97112 NEUROMUSCULAR REEDUCATION: CPT

## 2021-08-09 PROCEDURE — 2060000000 HC ICU INTERMEDIATE R&B

## 2021-08-09 PROCEDURE — 6360000002 HC RX W HCPCS: Performed by: NURSE PRACTITIONER

## 2021-08-09 PROCEDURE — 6370000000 HC RX 637 (ALT 250 FOR IP): Performed by: INTERNAL MEDICINE

## 2021-08-09 PROCEDURE — 51798 US URINE CAPACITY MEASURE: CPT

## 2021-08-09 PROCEDURE — 6370000000 HC RX 637 (ALT 250 FOR IP): Performed by: NURSE PRACTITIONER

## 2021-08-09 RX ORDER — HYDRALAZINE HYDROCHLORIDE 20 MG/ML
10 INJECTION INTRAMUSCULAR; INTRAVENOUS EVERY 6 HOURS PRN
Status: DISCONTINUED | OUTPATIENT
Start: 2021-08-09 | End: 2021-08-10 | Stop reason: HOSPADM

## 2021-08-09 RX ORDER — LISINOPRIL 20 MG/1
20 TABLET ORAL DAILY
COMMUNITY

## 2021-08-09 RX ORDER — TAMSULOSIN HYDROCHLORIDE 0.4 MG/1
0.4 CAPSULE ORAL DAILY
Status: DISCONTINUED | OUTPATIENT
Start: 2021-08-09 | End: 2021-08-10 | Stop reason: HOSPADM

## 2021-08-09 RX ORDER — METOPROLOL SUCCINATE 50 MG/1
100 TABLET, EXTENDED RELEASE ORAL NIGHTLY
COMMUNITY

## 2021-08-09 RX ORDER — GABAPENTIN 300 MG/1
300 CAPSULE ORAL 3 TIMES DAILY
COMMUNITY

## 2021-08-09 RX ADMIN — GABAPENTIN 300 MG: 300 CAPSULE ORAL at 20:47

## 2021-08-09 RX ADMIN — GABAPENTIN 300 MG: 300 CAPSULE ORAL at 08:56

## 2021-08-09 RX ADMIN — LEVOTHYROXINE AND LIOTHYRONINE 60 MG: 38; 9 TABLET ORAL at 06:10

## 2021-08-09 RX ADMIN — POLYETHYLENE GLYCOL 3350 17 G: 17 POWDER, FOR SOLUTION ORAL at 08:56

## 2021-08-09 RX ADMIN — HYDRALAZINE HYDROCHLORIDE 10 MG: 20 INJECTION INTRAMUSCULAR; INTRAVENOUS at 12:38

## 2021-08-09 RX ADMIN — LEVOTHYROXINE SODIUM 50 MCG: 0.05 TABLET ORAL at 06:10

## 2021-08-09 RX ADMIN — HYDRALAZINE HYDROCHLORIDE 10 MG: 20 INJECTION INTRAMUSCULAR; INTRAVENOUS at 05:24

## 2021-08-09 RX ADMIN — LISINOPRIL 20 MG: 20 TABLET ORAL at 08:56

## 2021-08-09 RX ADMIN — TAMSULOSIN HYDROCHLORIDE 0.4 MG: 0.4 CAPSULE ORAL at 20:42

## 2021-08-09 RX ADMIN — SODIUM CHLORIDE, PRESERVATIVE FREE 10 ML: 5 INJECTION INTRAVENOUS at 20:42

## 2021-08-09 RX ADMIN — SODIUM CHLORIDE, PRESERVATIVE FREE 10 ML: 5 INJECTION INTRAVENOUS at 08:57

## 2021-08-09 RX ADMIN — GABAPENTIN 300 MG: 300 CAPSULE ORAL at 14:43

## 2021-08-09 RX ADMIN — METOPROLOL SUCCINATE 75 MG: 50 TABLET, EXTENDED RELEASE ORAL at 08:56

## 2021-08-09 RX ADMIN — ENOXAPARIN SODIUM 40 MG: 40 INJECTION SUBCUTANEOUS at 08:56

## 2021-08-09 ASSESSMENT — PAIN SCALES - GENERAL
PAINLEVEL_OUTOF10: 0

## 2021-08-09 NOTE — PLAN OF CARE
Problem: Falls - Risk of:  Goal: Will remain free from falls  Description: Will remain free from falls  Outcome: Ongoing     Problem: Falls - Risk of:  Goal: Absence of physical injury  Description: Absence of physical injury  Outcome: Ongoing     Problem: Daily Care:  Goal: Daily care needs are met  Description: Daily care needs are met  Outcome: Ongoing

## 2021-08-09 NOTE — PROGRESS NOTES
[facility]  Office: [address], DO, Antonette Rafael, DO, Nichelle Neri, DO, Kathi Gonzales, DO, Renée Williamson MD, Romario Camargo MD, Bogdan Yoon MD, Otoniel Villar MD, Stiven Walls MD, Sanaz Guzman MD, Leeann Mccray MD, Whitney Muhammad, DO, Jayda Pena MD, Sagar Laguerre DO, Kylee Anudjar MD,  Kay Love DO, Mynor Lozoya MD, Catrachita Rhoades MD, Art Cherry MD, Alanis Galicia MD, Montez Saab MD, Aleta Cole MD, Una Conte MD, Ricardo Encarnacion [facility], [facility], CNP, Geovanny Jade, CNP, Ralph Reis, CNS, Ozzy Hernandes, CNP, Coleen Allen, CNP, Aaron Burger, CNP, Georgie Lang, [facility], Karan Mayes, CNP, Chanda Holloway PA-C, Angel York, [facility], Anali Dela Cruz, CNP, Rozina Bernard, CNP, Alejandro Lundberg, CNP, Enedina Miramontes, CNP, Darryn Luo, CNP, Marcello Miller, CNP, Bhavik Colon, CNP, [facility], [facility]    Progress Note    2021    9:57 AM    Name:   Noemí Bryan  MRN:     [id number]     Acct:      [de-identified]   Room:   [address] [place] [address] Day:  1  Admit Date:  2021  1:47 PM    PCP:   Farzana Vegas MD  Code Status:  Full Code    Subjective:     C/C:   Chief Complaint   Patient presents with   Blase Liming Medication Reaction     Prialt    Emesis    Nausea    Difficulty Walking    Chills     Interval History Status: improved. Patient also feels like she is moving better and has better strength laying in bed, has not been up with therapy yet today. Denies any chest pain, shortness of breath, nausea or vomiting, fevers or chills or complaints. Brief History: This is a 68-year-old female with a history of chronic back pain that follows with Dr. Demetrius Tena for pain management. She presented shortly after having a Prialt injection for her chronic pain where she develops severe intractable nausea and vomiting, and weakness and difficulty ambulating and chills.   Upon evaluation here she is found to have evidence of bladder distention with urinary retention requiring Arreguin catheter placement. She has similar reaction with a prior Prialt injection which was felt to possibly inject in the wrong area and subsequently consented for the repeat injection. Review of Systems:     Constitutional:  negative for chills, fevers, sweats  Respiratory:  negative for cough, dyspnea on exertion, shortness of breath, wheezing  Cardiovascular:  negative for chest pain, chest pressure/discomfort, lower extremity edema, palpitations  Gastrointestinal:  negative for abdominal pain, constipation, diarrhea, nausea, vomiting  Neurological:  negative for dizziness, headache    Medications: Allergies: Allergies   Allergen Reactions    Ziconotide Acetate Nausea And Vomiting and Other (See Comments)     Difficulty walking, blurred vision, n/v    Sulfa Antibiotics Nausea And Vomiting       Current Meds:   Scheduled Meds:    enoxaparin  40 mg Subcutaneous Daily    sodium chloride flush  5-40 mL Intravenous 2 times per day    Vitamin D  2,000 Units Oral Daily    lisinopril  20 mg Oral Daily    metoprolol succinate  75 mg Oral Nightly    gabapentin  300 mg Oral TID    levothyroxine  50 mcg Oral Daily    thyroid  60 mg Oral Daily     Continuous Infusions:    sodium chloride      sodium chloride 75 mL/hr at 08/08/21 1126     PRN Meds: hydrALAZINE, sodium chloride, acetaminophen **OR** acetaminophen, magnesium sulfate, ondansetron **OR** ondansetron, polyethylene glycol, potassium chloride **OR** potassium alternative oral replacement **OR** potassium chloride, sodium chloride flush, traMADol    Data:     Past Medical History:   has a past medical history of Cancer ([address] Chronic back pain, Hyperthyroidism, Obstructive sleep apnea syndrome, and Urge incontinence. Social History:   reports that she has never smoked. She has never used smokeless tobacco. She reports current alcohol use.  She reports retention cyst.       Physical Examination:        General appearance:  alert, cooperative and no distress  Mental Status:  oriented to person, place and time and normal affect  Lungs:  clear to auscultation bilaterally, normal effort  Heart:  regular rate and rhythm, no murmur  Abdomen:  soft, nontender, nondistended, normal bowel sounds, no masses, hepatomegaly, splenomegaly  Extremities:  no edema, redness, tenderness in the calves  Skin:  no gross lesions, rashes, induration    Assessment:        Hospital Problems         Last Modified POA    * (Principal) Adverse reaction to drug 8/7/2021 Yes    Chronic back pain 8/7/2021 Yes    Weakness 8/7/2021 Yes    Intractable nausea and vomiting 8/7/2021 Yes    Congenital hypothyroidism without goiter 8/7/2021 Yes    Essential hypertension 8/7/2021 Yes    Urinary retention 8/8/2021 Yes    Adverse reaction to drug, initial encounter 8/8/2021 Yes          Plan:        1. Continue PT and OT. 2. Stool softeners as ordered  3. Monitor and control blood pressure   4. GI and DVT prophylaxis  5. Void trial today if she is progressing with therapy  6.  Discharge planning possibly later today if improved functional capacity and voiding without difficulty    Km Chinchilla DO  8/9/2021  9:57 AM

## 2021-08-09 NOTE — CARE COORDINATION
Discharge Planning:    Writer down to patient's room to inquire about HHC. Writer and spouse agreeable to [facility] and request referral be sent to Andrei. Writer [facility] able to accept at d/c. MELCHOR updated. The Plan for Transition of Care is related to the following treatment goals: SN/PT/OT    The Patient and/or patient representative was provided with a choice of provider and agrees   with the discharge plan. [x] Yes [] No    Freedom of choice list was provided with basic dialogue that supports the patient's individualized plan of care/goals, treatment preferences and shares the quality data associated with the providers.  [x] Yes [] No

## 2021-08-09 NOTE — PROGRESS NOTES
Physical Therapy  Facility/Department: [facility]  Daily Treatment Note  NAME: Scott Garner  : 1945  MRN: [id number]    Date of Service: 2021    Discharge Recommendations:  Home with Home health PT   PT Equipment Recommendations  Equipment Needed: No    Assessment   Body structures, Functions, Activity limitations: Decreased functional mobility ; Decreased ADL status; Decreased endurance;Decreased strength;Decreased balance  Assessment: Pt with minimal deficits noted in transfers, ambulation, balance, and endurance this session. Pt requires continued PT to maximize independence with functional mobility, balance, safety awareness & activity tolerance. Due to recent hospitalization and medical condition, pt would benefit from additional therapy at time of discharge to ensure safety. Please refer to the AM-PAC score for current functional status. Prognosis: Good  Decision Making: Medium Complexity  Exam: functional mobility, activity tolerance, Balance, & [employer] AM-PAC 6 Clicks Basic Mobility  Clinical Presentation: evolving  REQUIRES PT FOLLOW UP: Yes  Activity Tolerance  Activity Tolerance: Patient Tolerated treatment well;Patient limited by fatigue  Activity Tolerance: Pt able to ambulate in room with close supervision     Patient Diagnosis(es): The primary encounter diagnosis was General weakness. Diagnoses of Non-intractable vomiting with nausea, unspecified vomiting type and Unable to ambulate were also pertinent to this visit. has a past medical history of Cancer ([address] Chronic back pain, Hyperthyroidism, Obstructive sleep apnea syndrome, and Urge incontinence. has a past surgical history that includes Spine surgery (, , ); Breast surgery (Left, 10/2012); and Abdominoplasty. Restrictions     Subjective   General  Chart Reviewed: Yes  Response To Previous Treatment: Patient with no complaints from previous session. Family / Caregiver Present:  Yes All lines intact, call light within reach, and patient positioned comfortably at end of treatment. All patient needs addressed prior to ending therapy session. ,                       G-Code     OutComes Score                                                     AM-PAC Score  AM-PAC Inpatient Mobility Raw Score : 19  AM-PAC Inpatient T-Scale Score : 45.44  Mobility Inpatient CMS 0-100% Score: 41.77  Mobility Inpatient CMS G-Code Modifier:CK                Goals  Short term goals  Time Frame for Short term goals: 12 visits  Short term goal 1: to be indepenent with ambulation with RW for 100'  Short term goal 2: to be independent with all transfers  Short term goal 3: to be independent with HEP  Patient Goals   Patient goals : to be able to return to working out    Plan    Plan  Times per week: 1-2x/day; 5-6x/wk  Times per day: Daily  Plan weeks: 12 visits  Current Treatment Recommendations: Strengthening, Transfer Training, Endurance Training, Patient/Caregiver Education & Training, Gait Training, Home Exercise Program, Functional Mobility Training, Stair training, Safety Education & Training  Safety Devices  Type of devices:  All fall risk precautions in place, Patient at risk for falls, Left in bed, Bed alarm in place, Call light within reach, Gait belt  Restraints  Initially in place: No     Therapy Time   Individual Concurrent Group Co-treatment   Time In 1324         Time Out 1350         Minutes [id number] José Tillman, PT

## 2021-08-09 NOTE — PROGRESS NOTES
Patient ambulated with therapy. Per Dr. Jacek Chacko, mattson to be pulled if patient is ambulating well. Mattson pulled at 1:55pm, patient tolerated with no complications. Post void trial to begin. Patient instructed to call out when she feels she needs to urinate, patient verbalized understanding.

## 2021-08-09 NOTE — PROGRESS NOTES
Patient called out to urinate; hat placed in toilet. Patient urinated but was not able to make it in the toilet. Post void residual showed 250ml in bladder. Dr. Breezy Humphrey notified, instructed to monitor for the next time patient has to urinate and to repeat post void residual check.

## 2021-08-10 VITALS
BODY MASS INDEX: 23.91 KG/M2 | HEIGHT: 65 IN | WEIGHT: 143.5 LBS | TEMPERATURE: 98.4 F | SYSTOLIC BLOOD PRESSURE: 115 MMHG | OXYGEN SATURATION: 95 % | HEART RATE: 70 BPM | DIASTOLIC BLOOD PRESSURE: 56 MMHG | RESPIRATION RATE: 16 BRPM

## 2021-08-10 PROBLEM — E87.6 HYPOKALEMIA: Status: ACTIVE | Noted: 2021-08-10

## 2021-08-10 PROBLEM — Z95.828 PRESENCE OF IMPLANTED INFUSION PUMP: Status: ACTIVE | Noted: 2021-08-10

## 2021-08-10 PROCEDURE — 6370000000 HC RX 637 (ALT 250 FOR IP): Performed by: INTERNAL MEDICINE

## 2021-08-10 PROCEDURE — 97166 OT EVAL MOD COMPLEX 45 MIN: CPT

## 2021-08-10 PROCEDURE — 97535 SELF CARE MNGMENT TRAINING: CPT

## 2021-08-10 PROCEDURE — 2580000003 HC RX 258: Performed by: NURSE PRACTITIONER

## 2021-08-10 PROCEDURE — 99239 HOSP IP/OBS DSCHRG MGMT >30: CPT | Performed by: INTERNAL MEDICINE

## 2021-08-10 PROCEDURE — 97530 THERAPEUTIC ACTIVITIES: CPT

## 2021-08-10 PROCEDURE — 6360000002 HC RX W HCPCS: Performed by: NURSE PRACTITIONER

## 2021-08-10 RX ORDER — M-VIT,TX,IRON,MINS/CALC/FOLIC 27MG-0.4MG
1 TABLET ORAL DAILY
Status: DISCONTINUED | OUTPATIENT
Start: 2021-08-10 | End: 2021-08-10 | Stop reason: HOSPADM

## 2021-08-10 RX ORDER — ONDANSETRON 4 MG/1
4 TABLET, ORALLY DISINTEGRATING ORAL EVERY 8 HOURS PRN
Qty: 20 TABLET | Refills: 1 | Status: SHIPPED | OUTPATIENT
Start: 2021-08-10

## 2021-08-10 RX ORDER — TAMSULOSIN HYDROCHLORIDE 0.4 MG/1
0.4 CAPSULE ORAL DAILY
Qty: 30 CAPSULE | Refills: 3 | Status: SHIPPED | OUTPATIENT
Start: 2021-08-11

## 2021-08-10 RX ADMIN — Medication 2000 UNITS: at 10:02

## 2021-08-10 RX ADMIN — LISINOPRIL 20 MG: 20 TABLET ORAL at 10:07

## 2021-08-10 RX ADMIN — SODIUM CHLORIDE, PRESERVATIVE FREE 10 ML: 5 INJECTION INTRAVENOUS at 10:09

## 2021-08-10 RX ADMIN — ENOXAPARIN SODIUM 40 MG: 40 INJECTION SUBCUTANEOUS at 10:02

## 2021-08-10 RX ADMIN — TAMSULOSIN HYDROCHLORIDE 0.4 MG: 0.4 CAPSULE ORAL at 10:02

## 2021-08-10 RX ADMIN — Medication 1 TABLET: at 11:36

## 2021-08-10 RX ADMIN — MAGNESIUM GLUCONATE 500 MG ORAL TABLET 400 MG: 500 TABLET ORAL at 11:36

## 2021-08-10 RX ADMIN — GABAPENTIN 300 MG: 300 CAPSULE ORAL at 10:03

## 2021-08-10 RX ADMIN — LEVOTHYROXINE AND LIOTHYRONINE 60 MG: 38; 9 TABLET ORAL at 06:07

## 2021-08-10 RX ADMIN — METOPROLOL SUCCINATE 75 MG: 50 TABLET, EXTENDED RELEASE ORAL at 10:03

## 2021-08-10 RX ADMIN — LEVOTHYROXINE SODIUM 50 MCG: 0.05 TABLET ORAL at 06:07

## 2021-08-10 ASSESSMENT — ENCOUNTER SYMPTOMS
VOMITING: 0
SHORTNESS OF BREATH: 0
NAUSEA: 0
BACK PAIN: 1
COUGH: 0
ABDOMINAL PAIN: 0

## 2021-08-10 NOTE — PROGRESS NOTES
Occupational Therapy   Occupational Therapy Initial Assessment  Date: 8/10/2021   Patient Name: Angela Del Toro  MRN: [id number]     : 1945    RN Kimberly Heath reports patient is medically stable for therapy treatment this date. Chart reviewed prior to treatment and patient is agreeable for therapy. All lines intact and patient positioned comfortably at end of treatment. All patient needs addressed prior to ending therapy session. Due to recent hospitalization and medical condition, pt would benefit from additional therapy at time of discharge to ensure safety. Please refer to the AM-PAC score for current functional status. Date of Service: 8/10/2021    Discharge Recommendations:  Patient would benefit from continued therapy after discharge  OT Equipment Recommendations  Equipment Needed: Yes  Mobility Devices: ADL Assistive Devices  ADL Assistive Devices: Reacher;Long-handled [place];Long-handled Sponge;Emergency Alert System    Assessment   Performance deficits / Impairments: Decreased functional mobility ; Decreased safe awareness;Decreased balance;Decreased coordination;Decreased ADL status; Decreased posture;Decreased vision/visual deficit; Decreased endurance;Decreased high-level IADLs  Assessment: OT POC is recommended to address above deficits and improve pt's overall I and safety awareness to return home with family assist as needed. Prognosis: Good  Decision Making: Medium Complexity  OT Education: OT Role;Plan of Care;Energy Conservation;Transfer Training  Patient Education: safety in function, call light use/fall prevention, recommendations for continued therapy, pursed lip breathing. REQUIRES OT FOLLOW UP: Yes  Activity Tolerance  Activity Tolerance: Patient limited by fatigue;Patient limited by pain; Patient Tolerated treatment well  Activity Tolerance: fair minus  Safety Devices  Safety Devices in place: Yes  Type of devices: Call light within reach; Chair alarm in place; Left in chair;Patient at risk for falls;Gait belt;Nurse notified           Patient Diagnosis(es): The primary encounter diagnosis was General weakness. Diagnoses of Non-intractable vomiting with nausea, unspecified vomiting type and Unable to ambulate were also pertinent to this visit. has a past medical history of Cancer ([address] Chronic back pain, Hyperthyroidism, Obstructive sleep apnea syndrome, and Urge incontinence. has a past surgical history that includes Spine surgery (1989, 1990, 2008); Breast surgery (Left, 10/2012); and Abdominoplasty. PER H&P: Estela Shay is a 66 y.o. Non- / non  female who presents with Medication Reaction (Prialt), Emesis, Nausea, Difficulty Walking, and Chills   and is admitted to the hospital for the management of Adverse reaction to drug. Restrictions  Restrictions/Precautions  Restrictions/Precautions: Fall Risk  Position Activity Restriction  Other position/activity restrictions: RUE IV, up with assist, maintain heels off bed, alarms    Subjective   General  Chart Reviewed: Yes  Patient assessed for rehabilitation services?: Yes  Family / Caregiver Present: No  *RN in room when pt states she has chronic low back pain and rates 4/10.       Social/Functional History  Social/Functional History  Lives With: Spouse (pt states spouse is in fair health)  Type of Home: House  Home Layout: Able to Live on Main level with bedroom/bathroom, Two level (stays on main)  Home Access: Stairs to enter without rails  Entrance Stairs - Number of Steps: 2 steps up from garage; pt states her jessies assist with helping into house as needed  Bathroom Shower/Tub: Walk-in shower  Bathroom Toilet: Handicap height  Bathroom Equipment: Built-in shower seat, Grab bars in shower, Grab bars around toilet  Home Equipment: [employer], Rolling walker  Receives Help From: Family, Friend(s), Neighbor  ADL Assistance: Independent  Homemaking Assistance: Needs assistance (pt states she shares household duties however spouse does [de-identified])  Homemaking Responsibilities: Yes  Ambulation Assistance: Independent (she has been using a cane for the past 6 years)  Transfer Assistance: Independent  Active : Yes  Mode of Transportation: Car  Occupation: Retired  Type of occupation: , sales  Leisure & Hobbies: cooking, work out at [employer]  Additional Comments: Pt denies recent falls. Objective   Vision: Impaired  Vision Exceptions: Wears glasses for distance;Wears glasses for reading (pt states when she initially came to [facility] she could not see as her eyes were jumpy however has resolved)  Hearing: Within functional limits    Orientation  Overall Orientation Status: Within Functional Limits  Observation/Palpation  Posture: Good (with RW)  Edema: none  Balance  Sitting Balance: Stand by assistance  Standing Balance: Contact guard assistance (with RW)  Standing Balance  Time: stand anastasia 1-2 mins with RW for functional tasks  Functional Mobility  Functional - Mobility Device: Rolling Walker  Activity:  (chair to toilet, toilet to end of bed and back toilet, toilet to chair)  Assist Level: Contact guard assistance  Functional Mobility Comments: MIN cues needed for pacing, scanning, RW safety/mgt to reduce risk for falls. Toilet Transfers  Toilet - Technique: Ambulating (completed x2)  Equipment Used: Grab bars  Toilet Transfer: Contact guard assistance  Toilet Transfers Comments: Min cues needed for hand placement on grab bar to increase safety. ADL  Feeding: Setup  Grooming: Setup;Supervision (seated and for joseph hand )  UE Bathing: Setup;Stand by assistance  LE Bathing: Setup;Contact guard assistance  UE Dressing: Setup;Minimal assistance (with hosp gown)  LE Dressing: Setup;Minimal assistance (Pt was able to yoli B socks seated in chair with increased time and set up/SBA. )  Toileting: Setup;Minimal assistance (with gown mgt; pt had small BM x2 this session.   CG to stand at grab bar and RW as pt completed posterior hygiene)  Tone RUE  RUE Tone: Normotonic  Tone LUE  LUE Tone: Normotonic  Coordination  Movements Are Fluid And Coordinated: No  Coordination and Movement description: Fine motor impairments (arthritic changes noted in B hands)     Bed mobility  Supine to Sit: Unable to assess (pt up in chair upon arrival)  Sit to Supine: Unable to assess (pt requested to stay up in chair)  Transfers  Stand Step Transfers: Contact guard assistance  Sit to stand: Contact guard assistance  Stand to sit: Contact guard assistance  Transfer Comments: MOD-MIN cues needed for hand placement reaching back to surface, scanning, pacing, RW safety/mgt all to reduce falls and increase overall safety. Cognition  Overall Cognitive Status: Exceptions  Arousal/Alertness: Appropriate responses to stimuli  Following Commands:  Follows all commands without difficulty  Attention Span: Appears intact  Memory: Appears intact  Safety Judgement: Decreased awareness of need for safety;Decreased awareness of need for assistance  Problem Solving: Decreased awareness of errors;Assistance required to correct errors made;Assistance required to identify errors made  Insights: Decreased awareness of deficits  Initiation: Does not require cues  Sequencing: Does not require cues  Perception  Overall Perceptual Status: WFL     Sensation  Overall Sensation Status: WFL        LUE AROM (degrees)  LUE AROM : WFL  RUE AROM (degrees)  RUE AROM : WFL  LUE Strength  Gross LUE Strength: WFL  LUE Strength Comment: BUE strength grossly 4 plus/5  RUE Strength  Gross RUE Strength: WFL                   Plan   Plan  Times per week: 4-5x/week 1x/day as anastasia  Current Treatment Recommendations: Strengthening, Endurance Training, Patient/Caregiver Education & Training, Balance Training, Functional Mobility Training, Safety Education & Training, Pain Management, Home Management Training, Self-Care / ADL, Equipment Evaluation, Education, & procurement                                                    AM-PAC Score   19          Goals  Short term goals  Time Frame for Short term goals: by discharge, pt to demo  Short term goal 1: I with BUE HEP with use of handouts as needed to maintain strength. Short term goal 2: total body ADL tasks with use of AD/AE and grab bar to SUP-MOD I. Short term goal 3: ADL transfers and functional mob with AD to SUP-MOD I. Short term goal 4: bed mob tasks with use of rail as needed to MOD I. Short term goal 5: standing to SUP with AD anastasia > 7 mins to reduce falls in function. Long term goals  Long term goal 1: Pt/family to be I with EC/WS and fall prevention tech as well as DME/AE recommendations with use of handouts. Patient Goals   Patient goals : Go home today!        Therapy Time   Individual Concurrent Group Co-treatment   Time In 1006 (plus 10 min chart review/RN communication)         Time Out 1042         Minutes 36         Timed Code Treatment Minutes: 25 Minutes       Claudette Gonzalez OT

## 2021-08-10 NOTE — PROGRESS NOTES
[facility]  Office: [address], DO, Aimee Gamboa, DO, Jas Blair, DO, Fred Giraldo Blood, DO, Poncho Carpenter MD, Edwin Easley MD, Carlos Padgett MD, Rell Eckert MD, Joyce Marquez MD, Jillian Man MD, Brian Askew MD, Neeru Reyes MD, Primo Mckoy, DO, Mayo Cruz MD, Jericho Cason, DO, Marta Weems MD,  Nae Riley, DO, Andi Wong MD, Poli Joshua MD, Jody Graham MD, Kashif Stoner MD, Ahsan Herrera, CNP, [facility] Shabana, CNP, Vivian Ernst, CNP, Leisa Melendez, CNS, Eve Nina, CNP, Armida Andres, CNP, Tr Bonilla, CNP, Jerod Cason, CNP, Brionna Jha, CNP, LISSETTE RamirezC, Gladys Dominguez, [facility], Jone Montelongo, CNP, Brayden Chowdhury, CNP, Mack Mckeon, CNP, Pat Coleman, CNP, Nirmala Antoine, CNP, [place], [address]    Progress Note    8/10/2021    10:45 AM    Name:   Ether Opitz  MRN:     [id number]     [place]:      [de-identified]   Room:   1016/1016-02  IP Day:  2  Admit Date:  8/6/2021  1:47 PM    PCP:   Angelo Cheng MD  Code Status:  Full Code    Subjective:     C/C:   Chief Complaint   Patient presents with   Natasha Curl Medication Reaction     Prialt    Emesis    Nausea    Difficulty Walking    Chills       Interval History Status:  Much improved  Pain rated 4/10 (generally 7/10 at home)  No nausea or vomiting  Ate well with breakfast  Ambulating  Urinating okay  Hoping to go home    Data Base Updates:  BP has been somewhat labile    Brief History:     As documented in the medical record: \"This is a 64-year-old female with a history of chronic back pain that follows with Dr. Sudhakar Calderón for pain management. She presented shortly after having a Prialt injection for her chronic pain where she develops severe intractable nausea and vomiting, and weakness and difficulty ambulating and chills.   Upon evaluation here she is found to have evidence of bladder distention with shortness of breath. Cardiovascular: Negative for chest pain and palpitations. Gastrointestinal: Negative for abdominal pain, nausea (Resolved) and vomiting (Resolved). Genitourinary: Positive for difficulty urinating (No further retention symptoms). Negative for flank pain and hematuria. Musculoskeletal: Positive for back pain (Chronic). Physical Examination:        Physical Exam  Vitals reviewed. Constitutional:       General: She is not in acute distress. Appearance: She is not diaphoretic. HENT:      Head: Normocephalic. Nose: Nose normal.   Eyes:      General: No scleral icterus. Conjunctiva/sclera: Conjunctivae normal.   Neck:      Trachea: No tracheal deviation. Cardiovascular:      Rate and Rhythm: Normal rate and regular rhythm. Pulmonary:      Effort: Pulmonary effort is normal. No respiratory distress. Breath sounds: Normal breath sounds. No wheezing or rales. Chest:      Chest wall: No tenderness. Abdominal:      General: Bowel sounds are normal. There is no distension. Palpations: Abdomen is soft. Tenderness: There is no abdominal tenderness. Musculoskeletal:         General: No tenderness. Cervical back: Neck supple. Comments: Pain pump in place   Skin:     General: Skin is warm and dry. Neurological:      Mental Status: She is alert and oriented to person, place, and time. Vitals:  /67   Pulse 65   Temp 98.5 °F (36.9 °C) (Oral)   Resp 16   Ht 5' 5\" (1.651 m)   Wt 143 lb 8 oz (65.1 kg)   SpO2 94%   BMI 23.88 kg/m²   Temp (24hrs), Av.4 °F (36.9 °C), Min:97.9 °F (36.6 °C), Max:98.6 °F (37 °C)    No results for input(s): POCGLU in the last 72 hours. I/O (24Hr):     Intake/Output Summary (Last 24 hours) at 8/10/2021 1045  Last data filed at 8/10/2021 0916  Gross per 24 hour   Intake --   Output 2849 ml   Net -2849 ml       Labs:  Hematology:  Recent Labs     21  0514   WBC 6.8   RBC 3.96   HGB 12.9   HCT 39.8   .5   MCH 32.6   MCHC 32.4   RDW 13.2      MPV 9.2   INR 0.9     Chemistry:  Recent Labs     08/08/21  0514      K 3.4*      CO2 23   GLUCOSE 96   BUN 11   CREATININE 0.59   MG 2.2   ANIONGAP 11   LABGLOM >60   GFRAA >60   CALCIUM 8.8   No results for input(s): PROT, LABALBU, LABA1C, X6JPMMK, O6RLXCZ, FT4, TSH, AST, ALT, LDH, GGT, ALKPHOS, LABGGT, BILITOT, BILIDIR, AMMONIA, AMYLASE, LIPASE, LACTATE, CHOL, HDL, LDLCHOLESTEROL, CHOLHDLRATIO, TRIG, VLDL, IXQ79BR, PHENYTOIN, PHENYF, URICACID, POCGLU in the last 72 hours. ABG:No results found for: POCPH, PHART, PH, POCPCO2, VML7UOG, PCO2, POCPO2, PO2ART, PO2, POCHCO3, ECZ9DRN, HCO3, NBEA, PBEA, BEART, BE, THGBART, THB, DZX6BWC, BNET1BAO, W8MRLUYP, O2SAT, FIO2  No results found for: SPECIAL  No results found for: CULTURE    Radiology:  CT Head WO Contrast    Result Date: 8/7/2021  1. No evidence of acute intracranial abnormality. 2. 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 3. Near opacification of left maxillary sinus secondary to presence of large posterior retention cyst.         Assessment:        Principal Problem: Adverse reaction to drug, Prialt  Active Problems:    Chronic back pain    Weakness    Intractable nausea and vomiting    Congenital hypothyroidism without goiter    Essential hypertension    Urinary retention    Adverse reaction to drug, initial encounter    Hypokalemia    Presence of implanted infusion pump, pain pump in place  Resolved Problems:    * No resolved hospital problems.  *      Plan:        Pain management  Follow-up with Dr. Raul Spears as scheduled  PT/OT  Blood Pressure - Monitor and control  Observe for urine retention  Correct electrolyte abnormalities   Discharge planning  Home this afternoon if stable  The patient was instructed to follow up with their PCP, Tigre Pitts MD in one week       Chuy Walker DO  8/10/2021  10:45 AM

## 2021-08-10 NOTE — CARE COORDINATION
Discharge planning    Patient to be discharged home today. notified [facility] of the discharge will need MELCHOR signed.      Follow up appt made with pcp and soonest can get in is 8/23

## 2021-08-10 NOTE — PLAN OF CARE
Problem: Falls - Risk of:  Goal: Will remain free from falls  Description: Will remain free from falls  Outcome: Ongoing  Note: Patient is a fall risk during this admission. Fall risk assessment was performed. Patient is absent of falls. Bed is in the lowest position. Wheels on the bed are locked. Call light and bed side table are within reach. Clutter is removed. Patient was educated to call out when needing assistance or wanting to get out of bed. Patient offered toileting assistance during rounding. Hourly rounds have been performed. Problem: Pain:  Goal: Pain level will decrease  Description: Pain level will decrease  Outcome: Ongoing  Note: Pt medicated with pain medication prn. Assessed all pain characteristics including level, type, location, frequency, and onset. Non-pharmacologic interventions offered to pt as well. Pt states pain is tolerable at this time. Will continue to monitor      Problem: Safety:  Goal: Free from accidental physical injury  Description: Free from accidental physical injury  Outcome: Ongoing  Note: Review with the patient and family (as appropriate) the patients medications, allergies, and medical conditions; ensure that the medical record is accurate and current. Use personal protective equipment, as appropriate. Perform hand hygiene before touching the patient, before performing a clean or sterile procedure, after body fluid exposure or risk, after touching the patient, and after touching the patients surroundings. Educate the patient and family (as appropriate) about preventing the spread of germs. Encourage visitors to wash their hands or use hand  before entering and when leaving the patients room. Keep fingernails short; avoid wearing nail polish or artificial nails. Wash equipment between patients, following  guidelines. Verify that the patient is wearing an identification band.   Use at least two patient identifiers before performing procedures or administering medications. Follow all facility-required patient safety measures (such as medication barcode scanning and blood product administration matching).

## 2021-08-10 NOTE — PLAN OF CARE
Problem: Falls - Risk of:  Goal: Will remain free from falls  Description: Will remain free from falls  8/10/2021 1310 by Destin Jara RN  Outcome: Completed  8/10/2021 1150 by Destin Jara RN  Outcome: Ongoing  8/10/2021 0038 by Pedro Jorge RN  Outcome: Ongoing  Note: Patient is a fall risk during this admission. Fall risk assessment was performed. Patient is absent of falls. Bed is in the lowest position. Wheels on the bed are locked. Call light and bed side table are within reach. Clutter is removed. Patient was educated to call out when needing assistance or wanting to get out of bed. Patient offered toileting assistance during rounding. Hourly rounds have been performed. Goal: Absence of physical injury  Description: Absence of physical injury  8/10/2021 1310 by Destin Jara RN  Outcome: Completed  8/10/2021 1150 by Destin Jara RN  Outcome: Ongoing  8/10/2021 0038 by Pedro Jorge RN  Outcome: Ongoing     Problem: Pain:  Description: Pain management should include both nonpharmacologic and pharmacologic interventions. Goal: Pain level will decrease  Description: Pain level will decrease  8/10/2021 1310 by Destin Jara RN  Outcome: Completed  8/10/2021 0038 by Pedro Jorge RN  Outcome: Ongoing  Note: Pt medicated with pain medication prn. Assessed all pain characteristics including level, type, location, frequency, and onset. Non-pharmacologic interventions offered to pt as well. Pt states pain is tolerable at this time.  Will continue to monitor   Goal: Control of acute pain  Description: Control of acute pain  8/10/2021 1310 by Destin Jara RN  Outcome: Completed  8/10/2021 0038 by Pedro Jorge RN  Outcome: Ongoing  Goal: Control of chronic pain  Description: Control of chronic pain  8/10/2021 1310 by Destin Jara RN  Outcome: Completed  8/10/2021 0038 by Pedro Jorge RN  Outcome: Ongoing     Problem: Integrity:  Goal: Will show no infection signs and symptoms  Description: Will show no infection signs and symptoms  8/10/2021 0038 by Dennis Nunes RN  Outcome: Completed  Goal: Absence of new skin breakdown  Description: Absence of new skin breakdown  8/10/2021 0038 by Dennis Nunes RN  Outcome: Completed

## 2021-08-10 NOTE — PLAN OF CARE
Problem: Falls - Risk of:  Goal: Will remain free from falls  Description: Will remain free from falls  8/10/2021 1150 by Venkatesh Lynn RN  Outcome: Ongoing  8/10/2021 0038 by Dennis Nunes RN  Outcome: Ongoing  Note: Patient is a fall risk during this admission. Fall risk assessment was performed. Patient is absent of falls. Bed is in the lowest position. Wheels on the bed are locked. Call light and bed side table are within reach. Clutter is removed. Patient was educated to call out when needing assistance or wanting to get out of bed. Patient offered toileting assistance during rounding. Hourly rounds have been performed.     Goal: Absence of physical injury  Description: Absence of physical injury  8/10/2021 1150 by Venkatesh Lynn RN  Outcome: Ongoing  8/10/2021 0038 by Dennis Nunes RN  Outcome: Ongoing

## 2021-08-10 NOTE — PROGRESS NOTES
The pt was discharged to home. The discharge instructions were given and reviewed with the pt. She was escorted to the exit per wheelchair in stalbe condition.

## 2021-08-11 ENCOUNTER — CARE COORDINATION (OUTPATIENT)
Dept: CASE MANAGEMENT | Age: 76
End: 2021-08-11

## 2021-08-11 NOTE — CARE COORDINATION
facilitator. Was this a readmission? No  Patient stated reason for admission: nausea , urinay retention  Patients top risk factors for readmission: lack of knowledge about disease    Care Transition Nurse (CTN) contacted the patient by telephone to perform post hospital discharge assessment. Verified name and  with patient as identifiers. Provided introduction to self, and explanation of the CTN role. CTN reviewed discharge instructions, medical action plan and red flags with patient who verbalized understanding. Patient given an opportunity to ask questions and does not have any further questions or concerns at this time. Were discharge instructions available to patient? Yes. Reviewed appropriate site of care based on symptoms and resources available to patient including: PCP, Specialist and Home health. The patient agrees to contact the PCP office for questions related to their healthcare. Medication reconciliation was performed with patient, who verbalizes understanding of administration of home medications. Advised obtaining a 90-day supply of all daily and as-needed medications. Covid Risk Education     Educated patient about risk for severe COVID-19 due to risk factors according to CDC guidelines. LPN CC reviewed discharge instructions, medical action plan and red flag symptoms with the patient who verbalized understanding. Discussed COVID vaccination status: Yes. Education provided on COVID-19 vaccination as appropriate. Discussed exposure protocols and quarantine with CDC Guidelines. Patient was given an opportunity to verbalize any questions and concerns and agrees to contact LPN CC or health care provider for questions related to their healthcare. Reviewed and educated patient on any new and changed medications related to discharge diagnosis. Was patient discharged with a pulse oximeter?  No Discussed and confirmed pulse oximeter discharge instructions and when to notify provider or seek emergency care. LPN CC provided contact information. No further follow-up call indicated based on severity of symptoms and risk factors. Care Transitions 24 Hour Call    Care Transitions Interventions         Follow Up  No future appointments.     Aleta Castillo, RADHA

## 2021-08-11 NOTE — DISCHARGE SUMMARY
[facility]  Office: [address], DO, Paul Smoker, DO, Maria M [facility], DO, Nuvia Mcfaddenn Blood, DO, Colonel Amaury MD, Larisa Marshall MD, Karen Thomas MD, Gabbi Tyler MD, Hazel Montgomery MD, Cyrus Olivas MD, Malika Dallas MD, Melissa Patrick MD, Mary Ann Pinto, DO, Dax Dao MD, Artem Rubio, DO, Gema Reyes MD,  Eugenio Bal, DO, Fco Palumbo MD, Nayana Clarke MD, Wendi Bolton MD, Sherin Fuller MD, Dion Cason, [facility], [facility] Ryley, CNP, Rhea Mckinley, CNP, Winnie Mcneal, CNS, Moises Michael, CNP, Jair Noyola, CNP, Delana Spatz, CNP, Julian Lorenzo, CNP, Dirk Coronel, CNP, Sapphire Gusman PA-C, Grazyna Glover, [facility], Caitie Eddy, CNP, Shmuel Dies, CNP, Indra Escalante, CNP, Abdi Button, CNP, Fatou Burt, CNP, Savanah Payne, CNP         [address]    Discharge Summary    Patient ID: Linh Worthy  :  1945   MRN: [id number]     ACCOUNT:  [de-identified]   Patient's PCP: Main Bravo MD  Admit Date: 2021   Discharge Date: 8/10/2021    Discharge Physician: Idris Aguirre DO     The patient was seen and examined on day of discharge and this discharge summary is in conjunction with any daily progress note from day of discharge.     Active Discharge Diagnoses:     Primary Problem  Adverse reaction to drug      [place] Problems    Diagnosis Date Noted    Hypokalemia [E87.6] 08/10/2021    Presence of implanted infusion pump, pain pump in place [Z95.828] 08/10/2021    Labile blood pressure [R09.89]     Urinary retention [R33.9] 2021    Adverse reaction to drug, initial encounter Cristopher Christina 2021    Weakness [R53.1] 2021    Adverse reaction to drug, Prialt [T50.905A] 2021    Intractable nausea and vomiting [R11.2] 2021    Congenital hypothyroidism without goiter [E03.1] 2021    Essential hypertension [I10] 2021    Chronic back pain [M54.9, G89.29] 05/03/2018         Hospital Course:     Brief History:  As documented in the medical record: \"This is a 78-year-old female with a history of chronic back pain that follows with Dr. Corrie García for pain management. Luisa Encarnacion presented shortly after having a Prialt injection for her chronic pain where she develops severe intractable nausea and vomiting, and weakness and difficulty ambulating and chills.  Upon evaluation here she is found to have evidence of bladder distention with urinary retention requiring Arreguin catheter placement.  She has similar reaction with a prior Prialt injection which was felt to possibly inject in the wrong area and subsequently consented for the repeat injection. \"      The patient responded to conservative management  Discharge planning was initiated    Discharge plan:     Pain management  Follow-up with Dr. Corrie García as scheduled  PT/OT  Blood Pressure - Monitor and control  Observe for urine retention  Correct electrolyte abnormalities   Discharge planning  Home this afternoon if stable  The patient was instructed to follow up with their PCP, Ria Garza MD in one week     No discharge procedures on file. Significant Diagnostic Studies:     Labs / Micro:  Labs:  Hematology:  Recent Labs     08/08/21  0514   WBC 6.8   RBC 3.96   HGB 12.9   HCT 39.8   .5   MCH 32.6   MCHC 32.4   RDW 13.2      MPV 9.2   INR 0.9     Chemistry:  Recent Labs     08/08/21  0514      K 3.4*      CO2 23   GLUCOSE 96   BUN 11   CREATININE 0.59   MG 2.2   ANIONGAP 11   LABGLOM >60   GFRAA >60   CALCIUM 8.8   No results for input(s): PROT, LABALBU, LABA1C, D1BEKJO, N6GQESX, FT4, TSH, AST, ALT, LDH, GGT, ALKPHOS, LABGGT, BILITOT, BILIDIR, AMMONIA, AMYLASE, LIPASE, LACTATE, CHOL, HDL, LDLCHOLESTEROL, CHOLHDLRATIO, TRIG, VLDL, XUR46JB, PHENYTOIN, PHENYF, URICACID, POCGLU in the last 72 hours.       Radiology:    CT Head WO Contrast    Result Date: 8/7/2021  EXAMINATION: CT OF THE opportunity to be involved in this patient's care.

## 2021-10-14 ENCOUNTER — OFFICE VISIT (OUTPATIENT)
Dept: INFECTIOUS DISEASES | Age: 76
End: 2021-10-14
Payer: MEDICARE

## 2021-10-14 ENCOUNTER — TELEPHONE (OUTPATIENT)
Dept: INFECTIOUS DISEASES | Age: 76
End: 2021-10-14

## 2021-10-14 VITALS
OXYGEN SATURATION: 98 % | HEART RATE: 61 BPM | HEIGHT: 65 IN | RESPIRATION RATE: 18 BRPM | WEIGHT: 146.2 LBS | TEMPERATURE: 98.2 F | BODY MASS INDEX: 24.36 KG/M2 | DIASTOLIC BLOOD PRESSURE: 78 MMHG | SYSTOLIC BLOOD PRESSURE: 143 MMHG

## 2021-10-14 DIAGNOSIS — G89.29 CHRONIC BILATERAL LOW BACK PAIN WITHOUT SCIATICA: ICD-10-CM

## 2021-10-14 DIAGNOSIS — A69.20 LYME DISEASE: Primary | ICD-10-CM

## 2021-10-14 DIAGNOSIS — Z98.890 HISTORY OF SPINAL SURGERY: ICD-10-CM

## 2021-10-14 DIAGNOSIS — M54.50 CHRONIC BILATERAL LOW BACK PAIN WITHOUT SCIATICA: ICD-10-CM

## 2021-10-14 DIAGNOSIS — I10 ESSENTIAL HYPERTENSION: ICD-10-CM

## 2021-10-14 DIAGNOSIS — M41.9 SCOLIOSIS, UNSPECIFIED SCOLIOSIS TYPE, UNSPECIFIED SPINAL REGION: ICD-10-CM

## 2021-10-14 PROCEDURE — 99204 OFFICE O/P NEW MOD 45 MIN: CPT | Performed by: INTERNAL MEDICINE

## 2021-10-14 RX ORDER — DOXYCYCLINE HYCLATE 100 MG
100 TABLET ORAL 2 TIMES DAILY
Qty: 28 TABLET | Refills: 2 | Status: SHIPPED | OUTPATIENT
Start: 2021-10-14 | End: 2021-11-11

## 2021-10-14 NOTE — PROGRESS NOTES
[facility] - Initial Office Consult Note  Today's Date and Time: 10/14/2021, 4:50 PM    Diagnostic Impression :     1. Lyme disease    2. Essential hypertension    3. Chronic bilateral low back pain without sciatica    4. Scoliosis, unspecified scoliosis type, unspecified spinal region    5. History of spinal surgery      Recommendations   · Doxycycline for 4 weeks for Lyme disease. · Referral to Dr Mata Obrien for spine evaluation because of hand weakness leading to dropping objects from her hands, increase in back pain. · Follow up in 4-6 weeks. Chief complaint/reason for consultation:     Chief Complaint   Patient presents with    Lyme Disease     New patient      History of Present Illness:   Milo Rubio is a 66y.o.-year-old  female who was initially evaluated on 10/14/2021. Patient seen at the request of Dr. Torey Harris. INITIAL HISTORY:     Patient evaluated in the office on 10-14-21. She came accompanied by her . Patient reports an accidental fall when she was 13 y/o leading to back and neck spine surgeries. She reports a diagnosis of scoliosis for which she has had multiple spine surgeries. Her spine surgeries were done at the [facility], including stem cell therapy. She indicates she has Brennan rods in her spine. She has suffered from back pain and myalgias as a result. More recently she has noticed an increase in her back pain. The pain has become severe enough that she is considering getting a pain pump with Dr. Graham Riding with [facility]. She reports being treated by homeopathic physicians to try to control her symptoms. She received a medication containing snake venom on three occasions and having to be hospitalized twice at [address] [place] [address] because of reactions to that medication.  More recently she has been followed by Dr Peyman De La Torre a chiropractor specializing in scoliosis in [place], [place]. new symptoms starting about 5-6 years ago after potential exposure to ticks. · Has serologic evidence of prior exposure to Lyme's disease. Denies prior antibiotic treatment for this condition. · I confirmed the exposure to Lyme's disease and suggested a course of treatment with oral doxycycline. I indicated that patients with late Lyme arthritis can have recurrent attacks of mono or pauci articular arthritis with a relapsing/remmiting pattern in addition to other symptoms. In this setting a 28 day course of doxycyline is advisable to prevent progression. · I also indicated that I could not separate her symptoms from those that may be caused by a worsening or progression of her original spine problems. I was concerned by her history of dropping objects from her hands. I suggested an evaluation by Dr Patricia Tapia to exclude deterioration of her spine issues. · I indicated that I had no knowledge of the homeopathic treatments she is currently receiving. Consequently, I could not comment on their potential efficacy or side effects. I pointed out that such treatments are usually outside the scope of mainstream medicine. PLAN:  · Doxycycline for 4 weeks for Lyme disease. · Referral to Dr Patricia Tapia for spine evaluation because of hand weakness leading to dropping objects from her hands and increase in back pain. · Follow up in 4-6 weeks. I have personally reviewed the past medical history, past surgical history, medications, social history, and family history, and I have updated the database accordingly.     Past Medical History:     Past Medical History:   Diagnosis Date    Cancer ([address] 10/2012    breast, left    Chronic back pain     Hyperthyroidism     Obstructive sleep apnea syndrome     Urge incontinence      Past Surgical  History:     Past Surgical History:   Procedure Laterality Date    ABDOMINOPLASTY      BREAST SURGERY Left 10/2012    [address] [place] [address], [facility]    has had 3 back surgeries Medications:     Current Outpatient Medications:     NONFORMULARY, araeinogalactan, Disp: , Rfl:     doxycycline hyclate (VIBRA-TABS) 100 MG tablet, Take 1 tablet by mouth 2 times daily for 28 days, Disp: 28 tablet, Rfl: 2    ondansetron (ZOFRAN-ODT) 4 MG disintegrating tablet, Take 1 tablet by mouth every 8 hours as needed for Nausea or Vomiting, Disp: 20 tablet, Rfl: 1    gabapentin (NEURONTIN) 300 MG capsule, Take 300 mg by mouth 3 times daily. , Disp: , Rfl:     metoprolol succinate (TOPROL XL) 50 MG extended release tablet, Take 100 mg by mouth nightly , Disp: , Rfl:     lisinopril (PRINIVIL;ZESTRIL) 20 MG tablet, Take 20 mg by mouth daily, Disp: , Rfl:     Cholecalciferol 50 MCG (2000 UT) TABS, Take 2,000 Units by mouth daily, Disp: , Rfl:     levothyroxine (SYNTHROID) 50 MCG tablet, Take 50 mcg by mouth every morning (before breakfast) , Disp: , Rfl:     NP THYROID 60 MG tablet, Take 60 mg by mouth every morning (before breakfast) , Disp: , Rfl:     CRANBERRY PO, Take 36 mg by mouth daily, Disp: , Rfl:     COD LIVER OIL PO, Take by mouth daily, Disp: , Rfl:     magnesium gluconate (MAGONATE) 500 MG tablet, Take 500 mg by mouth 2 times daily, Disp: , Rfl:     Multiple Vitamins-Minerals (THERAPEUTIC MULTIVITAMIN-MINERALS) tablet, Take 1 tablet by mouth daily, Disp: , Rfl:     Turmeric (CURCUMIN 95 PO), Take by mouth, Disp: , Rfl:     BROCCOLI EXTRACT PO, Take by mouth, Disp: , Rfl:     TAURINE PO, Take by mouth, Disp: , Rfl:     Probiotic Product (PROBIOTIC ADVANCED PO), Take by mouth, Disp: , Rfl:     tamsulosin (FLOMAX) 0.4 MG capsule, Take 1 capsule by mouth daily (Patient not taking: Reported on 10/14/2021), Disp: 30 capsule, Rfl: 3    traMADol (ULTRAM) 50 MG tablet, Take 50 mg by mouth every 6 hours as needed for Pain.  (Patient not taking: Reported on 10/14/2021), Disp: , Rfl:     atorvastatin (LIPITOR) 10 MG tablet, Take 10 mg by mouth daily (Patient not taking: Reported on 10/14/2021), Disp: , Rfl:     ASTAXANTHIN PO, Take by mouth (Patient not taking: Reported on 10/14/2021), Disp: , Rfl:      Social History:     Social History     Socioeconomic History    Marital status:      Spouse name: Not on file    Number of children: Not on file    Years of education: Not on file    Highest education level: Not on file   Occupational History    Not on file   Tobacco Use    Smoking status: Never Smoker    Smokeless tobacco: Never Used   Substance and Sexual Activity    Alcohol use: Yes     Comment: occassion    Drug use: No    Sexual activity: Not on file   Other Topics Concern    Not on file   Social History Narrative    Not on file     Social Determinants of Health     Financial Resource Strain: Low Risk     Difficulty of Paying Living Expenses: Not hard at all   Food Insecurity: No Food Insecurity    Worried About Running Out of Food in the Last Year: Never true    Trang of Food in the Last Year: Never true   Transportation Needs: No Transportation Needs    Lack of Transportation (Medical): No    Lack of Transportation (Non-Medical):  No   Physical Activity:     Days of Exercise per Week:     Minutes of Exercise per Session:    Stress:     Feeling of Stress :    Social Connections:     Frequency of Communication with Friends and Family:     Frequency of Social Gatherings with Friends and Family:     Attends Sikhism Services:     Active Member of Clubs or Organizations:     Attends Club or Organization Meetings:     Marital Status:    Intimate Partner Violence:     Fear of Current or Ex-Partner:     Emotionally Abused:     Physically Abused:     Sexually Abused:        Family History:     Family History   Problem Relation Age of Onset    Cancer Mother     Heart Disease Father     Stroke Father     Heart Disease Sister     Stroke Sister     Breast Cancer Maternal Grandmother         Allergies:   Ziconotide acetate and Sulfa antibiotics     Review of Systems:   Constitutional: No fevers or chills. No systemic complaints other than pain, fatigue  Head: No headaches  Eyes: No double vision or blurry vision. ENT: No sore throat or runny nose. . No hearing loss, tinnitus or vertigo. Cardiovascular: No chest pain or palpitations. No shortness of breath. No HAIRSTON  Lung: No shortness of breath or cough. No sputum production  Abdomen: No nausea, vomiting, diarrhea, or abdominal pain. .  Genitourinary: Increased urinary frequency. No dysuria. No hematuria. No suprapubic or CVA pain  Musculoskeletal: Muscle aches and pains. Joint pains swelling particularly of knees, hands, ankles. Hematologic: No bleeding or bruising. Neurologic: No headache,  numbness, or tingling. . Hand weakness, dropping of objects    Physical Examination :   BP (!) 143/78 (Site: Right Upper Arm, Position: Sitting, Cuff Size: Medium Adult)   Pulse 61   Temp 98.2 °F (36.8 °C) (Temporal)   Resp 18   Ht 5' 5\" (1.651 m)   Wt 146 lb 3.2 oz (66.3 kg)   SpO2 98% Comment: room air at rest  BMI 24.33 kg/m²    General Appearance: Awake, alert, and in no apparent distress  Head:  Normocephalic, no trauma  Eyes: Pupils equal, round, reactive, to light and accommodation; extraocular movements intact; sclera anicteric; conjunctivae pink. No embolic phenomena. ENT: Oropharynx clear, without erythema, exudate, or thrush. No tenderness of sinuses. Mouth/throat: mucosa pink and moist. No lesions. Dentition in good repair. Neck:Supple, without lymphadenopathy. Thyroid normal, No bruits. Pulmonary/Chest: Clear to auscultation, without wheezes, rales, or rhonchi. No dullness to percussion. Cardiovascular: Regular rate and rhythm without murmurs, rubs, or gallops. Abdomen: Soft, non tender. Bowel sounds normal. No organomegaly  All four Extremities: No cyanosis, clubbing, edema, or effusions. Neurologic: No gross sensory or motor deficits. Tendon reflexes equal bilaterally.    Skin: Warm and dry with good turgor. No signs of peripheral arterial or venous insufficiency. Medical Decision Making:   I have independently reviewed/ordered the following labs:    CBC with Differential:  Lab Results   Component Value Date    WBC 6.8 08/08/2021    WBC 8.2 08/06/2021    HGB 12.9 08/08/2021    HGB 14.2 08/06/2021    HCT 39.8 08/08/2021    HCT 42.3 08/06/2021     08/08/2021     08/06/2021    LYMPHOPCT 10 08/06/2021    LYMPHOPCT 29.5 09/09/2020    LYMPHOPCT 31.9 02/01/2020    MONOPCT 5 08/06/2021    MONOPCT 10.8 09/09/2020    MONOPCT 11.1 02/01/2020    EOSPCT 3.5 09/09/2020    EOSPCT 3.0 02/01/2020     BMP:   Lab Results   Component Value Date     08/08/2021     08/06/2021    K 3.4 08/08/2021    K 3.8 08/06/2021     08/08/2021     08/06/2021    CO2 23 08/08/2021    CO2 26 08/06/2021    BUN 11 08/08/2021    BUN 16 08/06/2021    CREATININE 0.59 08/08/2021    CREATININE 0.70 08/06/2021    MG 2.2 08/08/2021     Hepatic Function Panel:  Lab Results   Component Value Date    PROT 7.8 08/06/2021    LABALBU 4.7 08/06/2021    BILITOT 0.40 08/06/2021    ALKPHOS 75 08/06/2021    ALT 16 08/06/2021    ALT 20 09/09/2020    AST 16 08/06/2021    AST 20 09/09/2020     No results found for: RPR  No results found for: HIV  No results found for: [facility]  Lab Results   Component Value Date    MUCUS NOT REPORTED 08/06/2021    RBC 3.96 08/08/2021    TRICHOMONAS NOT REPORTED 08/06/2021    WBC 6.8 08/08/2021    YEAST NOT REPORTED 08/06/2021    TURBIDITY CLEAR 08/06/2021     Lab Results   Component Value Date    CREATININE 0.59 08/08/2021    GLUCOSE 96 08/08/2021     Thank you for allowing us to participate in the care of this patient. Please call with questions. Suresh Wyatt, MS3, participated in the evaluation of the patient under my direct supervision.     Mikala Patterson MD      Pager: [phone] - Office: [phone]

## 2021-10-14 NOTE — TELEPHONE ENCOUNTER
The pharmacy called. The patient was instructed to take 1 tablet twice a day for 28 days. Dr. Derrek Colmenares wrote a quantity for 28 tablets. They asked if they should increase the quantity to 56 tablets. Per Dr. Jalen Ding gave the pharmacy a verbal to increase the quantity.

## 2022-05-21 ENCOUNTER — HOSPITAL ENCOUNTER (EMERGENCY)
Facility: CLINIC | Age: 77
Discharge: HOME | End: 2022-05-21
Attending: SPECIALIST
Payer: MEDICARE

## 2022-05-21 ENCOUNTER — APPOINTMENT (OUTPATIENT)
Dept: CT IMAGING | Facility: CLINIC | Age: 77
End: 2022-05-21
Payer: MEDICARE

## 2022-05-21 ENCOUNTER — APPOINTMENT (OUTPATIENT)
Dept: GENERAL RADIOLOGY | Facility: CLINIC | Age: 77
End: 2022-05-21
Payer: MEDICARE

## 2022-05-21 VITALS
DIASTOLIC BLOOD PRESSURE: 71 MMHG | HEIGHT: 65 IN | BODY MASS INDEX: 22.99 KG/M2 | TEMPERATURE: 97.5 F | HEART RATE: 59 BPM | SYSTOLIC BLOOD PRESSURE: 174 MMHG | RESPIRATION RATE: 18 BRPM | WEIGHT: 138 LBS | OXYGEN SATURATION: 100 %

## 2022-05-21 DIAGNOSIS — R42 DIZZINESS: Primary | ICD-10-CM

## 2022-05-21 DIAGNOSIS — N39.0 ACUTE UTI: ICD-10-CM

## 2022-05-21 LAB
-: ABNORMAL
ABSOLUTE EOS #: 0 K/UL (ref 0–0.4)
ABSOLUTE LYMPH #: 0.8 K/UL (ref 1–4.8)
ABSOLUTE MONO #: 0.6 K/UL (ref 0.1–1.2)
ALBUMIN SERPL-MCNC: 4.8 G/DL (ref 3.5–5.2)
ALBUMIN/GLOBULIN RATIO: 1.5 (ref 1–2.5)
ALP BLD-CCNC: 82 U/L (ref 35–104)
ALT SERPL-CCNC: 51 U/L (ref 5–33)
AMORPHOUS: ABNORMAL
ANION GAP SERPL CALCULATED.3IONS-SCNC: 13 MMOL/L (ref 9–17)
AST SERPL-CCNC: 31 U/L
BACTERIA: ABNORMAL
BASOPHILS # BLD: 0 % (ref 0–2)
BASOPHILS ABSOLUTE: 0 K/UL (ref 0–0.2)
BILIRUB SERPL-MCNC: 0.6 MG/DL (ref 0.3–1.2)
BILIRUBIN DIRECT: 0.12 MG/DL
BILIRUBIN URINE: NEGATIVE
BILIRUBIN, INDIRECT: 0.48 MG/DL (ref 0–1)
BUN BLDV-MCNC: 23 MG/DL (ref 8–23)
CALCIUM SERPL-MCNC: 9.9 MG/DL (ref 8.6–10.4)
CHLORIDE BLD-SCNC: 98 MMOL/L (ref 98–107)
CO2: 28 MMOL/L (ref 20–31)
COLOR: YELLOW
CREAT SERPL-MCNC: 0.6 MG/DL (ref 0.5–0.9)
EOSINOPHILS RELATIVE PERCENT: 0 % (ref 1–4)
EPITHELIAL CELLS UA: ABNORMAL /HPF (ref 0–5)
GFR AFRICAN AMERICAN: >60 ML/MIN
GFR NON-AFRICAN AMERICAN: >60 ML/MIN
GFR SERPL CREATININE-BSD FRML MDRD: ABNORMAL ML/MIN/{1.73_M2}
GLUCOSE BLD-MCNC: 192 MG/DL (ref 70–99)
GLUCOSE URINE: ABNORMAL
HCT VFR BLD CALC: 48 % (ref 36–46)
HEMOGLOBIN: 15.9 G/DL (ref 12–16)
KETONES, URINE: ABNORMAL
LEUKOCYTE ESTERASE, URINE: NEGATIVE
LYMPHOCYTES # BLD: 8 % (ref 24–44)
MCH RBC QN AUTO: 31.9 PG (ref 26–34)
MCHC RBC AUTO-ENTMCNC: 33 G/DL (ref 31–37)
MCV RBC AUTO: 96.6 FL (ref 80–100)
MONOCYTES # BLD: 6 % (ref 2–11)
NITRITE, URINE: POSITIVE
OTHER OBSERVATIONS UA: ABNORMAL
PDW BLD-RTO: 13.3 % (ref 12.5–15.4)
PH UA: 7.5 (ref 5–8)
PLATELET # BLD: 328 K/UL (ref 140–450)
PMV BLD AUTO: 7.3 FL (ref 6–12)
POTASSIUM SERPL-SCNC: 3.9 MMOL/L (ref 3.7–5.3)
PROTEIN UA: NEGATIVE
RBC # BLD: 4.97 M/UL (ref 4–5.2)
RBC UA: ABNORMAL /HPF (ref 0–2)
SEG NEUTROPHILS: 86 % (ref 36–66)
SEGMENTED NEUTROPHILS ABSOLUTE COUNT: 9.1 K/UL (ref 1.8–7.7)
SODIUM BLD-SCNC: 139 MMOL/L (ref 135–144)
SPECIFIC GRAVITY UA: 1.02 (ref 1–1.03)
TOTAL PROTEIN: 8.1 G/DL (ref 6.4–8.3)
TROPONIN, HIGH SENSITIVITY: 11 NG/L (ref 0–14)
TURBIDITY: ABNORMAL
URINE HGB: ABNORMAL
UROBILINOGEN, URINE: NORMAL
WBC # BLD: 10.5 K/UL (ref 3.5–11)
WBC UA: ABNORMAL /HPF (ref 0–5)

## 2022-05-21 PROCEDURE — 99285 EMERGENCY DEPT VISIT HI MDM: CPT

## 2022-05-21 PROCEDURE — 93005 ELECTROCARDIOGRAM TRACING: CPT | Performed by: SPECIALIST

## 2022-05-21 PROCEDURE — 84484 ASSAY OF TROPONIN QUANT: CPT

## 2022-05-21 PROCEDURE — 36415 COLL VENOUS BLD VENIPUNCTURE: CPT

## 2022-05-21 PROCEDURE — 6370000000 HC RX 637 (ALT 250 FOR IP): Performed by: EMERGENCY MEDICINE

## 2022-05-21 PROCEDURE — 85025 COMPLETE CBC W/AUTO DIFF WBC: CPT

## 2022-05-21 PROCEDURE — 6360000002 HC RX W HCPCS: Performed by: SPECIALIST

## 2022-05-21 PROCEDURE — 71045 X-RAY EXAM CHEST 1 VIEW: CPT

## 2022-05-21 PROCEDURE — 6360000002 HC RX W HCPCS: Performed by: EMERGENCY MEDICINE

## 2022-05-21 PROCEDURE — 70450 CT HEAD/BRAIN W/O DYE: CPT

## 2022-05-21 PROCEDURE — 2580000003 HC RX 258: Performed by: SPECIALIST

## 2022-05-21 PROCEDURE — 80048 BASIC METABOLIC PNL TOTAL CA: CPT

## 2022-05-21 PROCEDURE — 96376 TX/PRO/DX INJ SAME DRUG ADON: CPT

## 2022-05-21 PROCEDURE — 6370000000 HC RX 637 (ALT 250 FOR IP): Performed by: SPECIALIST

## 2022-05-21 PROCEDURE — 82947 ASSAY GLUCOSE BLOOD QUANT: CPT

## 2022-05-21 PROCEDURE — 81001 URINALYSIS AUTO W/SCOPE: CPT

## 2022-05-21 PROCEDURE — 80076 HEPATIC FUNCTION PANEL: CPT

## 2022-05-21 PROCEDURE — 96374 THER/PROPH/DIAG INJ IV PUSH: CPT

## 2022-05-21 RX ORDER — 0.9 % SODIUM CHLORIDE 0.9 %
500 INTRAVENOUS SOLUTION INTRAVENOUS ONCE
Status: COMPLETED | OUTPATIENT
Start: 2022-05-21 | End: 2022-05-21

## 2022-05-21 RX ORDER — ONDANSETRON 4 MG/1
4 TABLET, FILM COATED ORAL EVERY 8 HOURS PRN
Qty: 20 TABLET | Refills: 0 | Status: SHIPPED | OUTPATIENT
Start: 2022-05-21

## 2022-05-21 RX ORDER — ONDANSETRON 2 MG/ML
4 INJECTION INTRAMUSCULAR; INTRAVENOUS ONCE
Status: COMPLETED | OUTPATIENT
Start: 2022-05-21 | End: 2022-05-21

## 2022-05-21 RX ORDER — SODIUM CHLORIDE 9 MG/ML
1000 INJECTION, SOLUTION INTRAVENOUS CONTINUOUS
Status: DISCONTINUED | OUTPATIENT
Start: 2022-05-21 | End: 2022-05-21 | Stop reason: HOSPADM

## 2022-05-21 RX ORDER — MECLIZINE HYDROCHLORIDE 25 MG/1
25 TABLET ORAL 3 TIMES DAILY PRN
Qty: 30 TABLET | Refills: 0 | Status: SHIPPED | OUTPATIENT
Start: 2022-05-21 | End: 2022-05-31

## 2022-05-21 RX ORDER — MECLIZINE HCL 12.5 MG/1
12.5 TABLET ORAL ONCE
Status: COMPLETED | OUTPATIENT
Start: 2022-05-21 | End: 2022-05-21

## 2022-05-21 RX ORDER — NITROFURANTOIN 25; 75 MG/1; MG/1
100 CAPSULE ORAL 2 TIMES DAILY
Qty: 14 CAPSULE | Refills: 0 | Status: SHIPPED | OUTPATIENT
Start: 2022-05-21 | End: 2022-05-28

## 2022-05-21 RX ORDER — NITROFURANTOIN 25; 75 MG/1; MG/1
100 CAPSULE ORAL ONCE
Status: COMPLETED | OUTPATIENT
Start: 2022-05-21 | End: 2022-05-21

## 2022-05-21 RX ADMIN — MECLIZINE 12.5 MG: 12.5 TABLET ORAL at 08:10

## 2022-05-21 RX ADMIN — SODIUM CHLORIDE 1000 ML: 9 INJECTION, SOLUTION INTRAVENOUS at 07:45

## 2022-05-21 RX ADMIN — ONDANSETRON 4 MG: 2 INJECTION INTRAMUSCULAR; INTRAVENOUS at 07:09

## 2022-05-21 RX ADMIN — NITROFURANTOIN MONOHYDRATE/MACROCRYSTALS 100 MG: 75; 25 CAPSULE ORAL at 10:08

## 2022-05-21 RX ADMIN — ONDANSETRON 4 MG: 2 INJECTION INTRAMUSCULAR; INTRAVENOUS at 07:46

## 2022-05-21 RX ADMIN — SODIUM CHLORIDE 500 ML: 9 INJECTION, SOLUTION INTRAVENOUS at 07:13

## 2022-05-21 ASSESSMENT — PAIN DESCRIPTION - RADICULAR PAIN: RADICULAR_PAIN: ABSENT

## 2022-05-21 NOTE — ED NOTES
provided number for Dr. Nelsy Saab - number provided does not accept calls, pt and  updated     Praveen Velasquez RN  05/21/22 [id number]

## 2022-05-21 NOTE — ED PROVIDER NOTES
[facility]  [address] [facility]  Phone: [phone]      Pt Name: Estela Shay  MRN: [id number]  [place] 1945  Date of evaluation: 5/21/2022      CHIEF COMPLAINT       Chief Complaint   Patient presents with    Dizziness         HISTORY OF PRESENT ILLNESS    Estela Shay is a 64 y.o. female who presents   Chief Complaint   Patient presents with    Dizziness   . 17-year-old female patient presents to the emergency department brought in via EMS for evaluation of dizziness associate with nausea and numerous episodes of vomiting since 9 PM last night. Patient admits to vertigo and has trouble with ambulation. She has tingling and numbness in bilateral lower extremity secondary to back injury since age 14. She denies any headache or neck pain. Patient was on fentanyl for back pain and this was changed to morphine about 1 week ago. She denies any chest pain, shortness of breath, palpitations or diaphoresis and denies any fever or chills. She denies any abdominal pain, constipation or diarrhea and denies any hematemesis, melena or hematochezia. She also denies any urinary symptoms. REVIEW OF SYSTEMS       Review of Systems    All systems reviewed and negative unless noted in HPI. The patient denies fever or constitutional symptoms. Denies any sore throat or rhinorrhea. Denies any neck pain or stiffness. Denies chest pain or shortness of breath. Presents with dizziness, vertigo, nausea,  vomiting   Denies any dysuria. Denies urinary frequency or hematuria. Denies musculoskeletal injury or pain. Denies any skin rash or edema. No easy bruising or bleeding. Denies any polyuria, polydypsia       PAST MEDICAL HISTORY    has a past medical history of Cancer ([address] Chronic back pain, Hyperthyroidism, Obstructive sleep apnea syndrome, and Urge incontinence.     SURGICAL HISTORY      has a past surgical history that includes Spine surgery mother; Heart Disease in her father and sister; Stroke in her father and sister. SOCIAL HISTORY      reports that she has never smoked. She has never used smokeless tobacco. She reports current alcohol use. She reports that she does not use drugs. PHYSICAL EXAM     INITIAL VITALS:  height is 5' 5\" (1.651 m) and weight is 62.6 kg (138 lb). Her oral temperature is 97.5 °F (36.4 °C). Her blood pressure is 179/151 (abnormal) and her pulse is 70. Her respiration is 16 and oxygen saturation is 99%. Physical Exam  Vitals and nursing note reviewed. Constitutional:       Appearance: She is well-developed. HENT:      Head: Normocephalic and atraumatic. Nose: Nose normal.   Eyes:      Extraocular Movements: Extraocular movements intact. Pupils: Pupils are equal, round, and reactive to light. Cardiovascular:      Rate and Rhythm: Normal rate and regular rhythm. Heart sounds: Normal heart sounds. No murmur heard. Pulmonary:      Effort: Pulmonary effort is normal. No respiratory distress. Breath sounds: Normal breath sounds. Abdominal:      General: Bowel sounds are normal. There is no distension. Palpations: Abdomen is soft. Tenderness: There is no abdominal tenderness. Musculoskeletal:      Cervical back: Normal range of motion and neck supple. Skin:     General: Skin is warm and dry. Neurological:      General: No focal deficit present. Mental Status: She is alert and oriented to person, place, and time. GCS: GCS eye subscore is 4. GCS verbal subscore is 5. GCS motor subscore is 6. DIFFERENTIAL DIAGNOSIS/ MDM:     Peripheral versus central vertigo, ACS, arrhythmia, UTI  We will establish IV access, treat with Zofran and Antivert  Obtain twelve-lead EKG, CBC, chemistries, cardiac markers, chest x-ray, urinalysis and a CT scan of the brain.     DIAGNOSTIC RESULTS     EKG: All EKG's are interpreted by the Emergency Department Physician who either signs or Co-signs this chart in the absence of a cardiologist.    EKG Interpretation    Interpreted by emergency department physician    Rhythm: normal sinus   Rate: normal  Axis: normal  Conduction: normal  ST Segments: no acute change  T Waves: no acute change  Q Waves: no acute change    Clinical Impression: no acute change, but this is a nonspecific EKG. RADIOLOGY:   I reviewed the radiologist interpretations:  XR CHEST PORTABLE    (Results Pending)   CT Head WO Contrast    (Results Pending)       No results found. LABS:  Labs Reviewed   CBC WITH AUTO DIFFERENTIAL   BASIC METABOLIC PANEL W/ REFLEX TO MG FOR LOW K   HEPATIC FUNCTION PANEL   TROPONIN   URINALYSIS WITH REFLEX TO CULTURE         EMERGENCY DEPARTMENT COURSE:   Vitals:    Vitals:    05/21/22 0658   BP: (!) 179/151   Pulse: 70   Resp: 16   Temp: 97.5 °F (36.4 °C)   TempSrc: Oral   SpO2: 99%   Weight: 62.6 kg (138 lb)   Height: 5' 5\" (1.651 m)     -------------------------  BP: (!) 179/151, Temp: 97.5 °F (36.4 °C), Pulse: 70, Resp: 16    Orders Placed This Encounter   Medications    0.9 % sodium chloride bolus    0.9 % sodium chloride infusion    ondansetron (ZOFRAN) injection 4 mg    meclizine (ANTIVERT) tablet 12.5 mg       During the emergency department course, patient was put on the monitor and IV normal saline bolus followed by infusion is ordered. Also patient will be given Zofran 4 mg IV push and Antivert 12.5 mg orally. Case will be turned over to Dr. Shelley Connelly at the end of my shift for further care and disposition at 7:15 AM.    I have reviewed the disposition diagnosis with the patient and or their family/guardian. I have answered their questions and given discharge instructions. They voiced understanding of these instructions and did not have any further questions or complaints. PROCEDURES:  None    FINAL IMPRESSION    No diagnosis found.       DISPOSITION/PLAN   DISPOSITION          PATIENT REFERRED TO:  No follow-up provider specified. DISCHARGE MEDICATIONS:  New Prescriptions    No medications on file       (Please note that portions of this note were completed with a voice recognition program.  Efforts were made to edit the dictations but occasionally words are mis-transcribed.)    Caryn Rey MD,, MD, F.A.C.E.P.   Attending Emergency Physician      Caryn Rey MD  05/21/22 7727

## 2022-05-21 NOTE — ED PROVIDER NOTES
ADDENDUM:        The patient was seen for Dizziness  . The patient's initial evaluation and plan have been discussed with the prior provider who initially evaluated the patient. Nursing Notes, Past Medical Hx, Past Surgical Hx, Social Hx, Allergies, and Family Hx were all reviewed. PAST MEDICAL HISTORY    has a past medical history of Cancer ([facility] [address] Chronic back pain, Hyperthyroidism, Obstructive sleep apnea syndrome, and Urge incontinence. SURGICAL HISTORY      has a past surgical history that includes Spine surgery (1989, 1990, 2008); Breast surgery (Left, 10/2012); and Abdominoplasty. CURRENT MEDICATIONS       Previous Medications    ASTAXANTHIN PO    Take by mouth    ATORVASTATIN (LIPITOR) 10 MG TABLET    Take 10 mg by mouth daily    BROCCOLI EXTRACT PO    Take by mouth    CHOLECALCIFEROL 50 MCG (2000 UT) TABS    Take 2,000 Units by mouth daily    COD LIVER OIL PO    Take by mouth daily    CRANBERRY PO    Take 36 mg by mouth daily    GABAPENTIN (NEURONTIN) 300 MG CAPSULE    Take 300 mg by mouth 3 times daily.     LEVOTHYROXINE (SYNTHROID) 50 MCG TABLET    Take 50 mcg by mouth every morning (before breakfast)     LISINOPRIL (PRINIVIL;ZESTRIL) 20 MG TABLET    Take 20 mg by mouth daily    MAGNESIUM GLUCONATE (MAGONATE) 500 MG TABLET    Take 500 mg by mouth 2 times daily    METOPROLOL SUCCINATE (TOPROL XL) 50 MG EXTENDED RELEASE TABLET    Take 100 mg by mouth nightly     MULTIPLE VITAMINS-MINERALS (THERAPEUTIC MULTIVITAMIN-MINERALS) TABLET    Take 1 tablet by mouth daily    NONFORMULARY    araeinogalactan    NP THYROID 60 MG TABLET    Take 60 mg by mouth every morning (before breakfast)     ONDANSETRON (ZOFRAN-ODT) 4 MG DISINTEGRATING TABLET    Take 1 tablet by mouth every 8 hours as needed for Nausea or Vomiting    PROBIOTIC PRODUCT (PROBIOTIC ADVANCED PO)    Take by mouth    TAMSULOSIN (FLOMAX) 0.4 MG CAPSULE    Take 1 capsule by mouth daily    TAURINE PO    Take by mouth    TRAMADOL (ULTRAM) 50 MG TABLET    Take 50 mg by mouth every 6 hours as needed for Pain. TURMERIC (CURCUMIN 95 PO)    Take by mouth       ALLERGIES     is allergic to ziconotide acetate and sulfa antibiotics. FAMILY HISTORY     She indicated that the status of her mother is unknown. She indicated that the status of her father is unknown. She indicated that the status of her sister is unknown. She indicated that the status of her maternal grandmother is unknown.     family history includes Breast Cancer in her maternal grandmother; Cancer in her mother; Heart Disease in her father and sister; Stroke in her father and sister. SOCIAL HISTORY      reports that she has never smoked. She has never used smokeless tobacco. She reports current alcohol use. She reports that she does not use drugs.     Diagnostic Results     EKG         LABS:   Results for orders placed or performed during the hospital encounter of 05/21/22   CBC with Auto Differential   Result Value Ref Range    WBC 10.5 3.5 - 11.0 k/uL    RBC 4.97 4.0 - 5.2 m/uL    Hemoglobin 15.9 12.0 - 16.0 g/dL    Hematocrit 48.0 (H) 36 - 46 %    MCV 96.6 80 - 100 fL    MCH 31.9 26 - 34 pg    MCHC 33.0 31 - 37 g/dL    RDW 13.3 12.5 - 15.4 %    Platelets 570 046 - 465 k/uL    MPV 7.3 6.0 - 12.0 fL    Seg Neutrophils 86 (H) 36 - 66 %    Lymphocytes 8 (L) 24 - 44 %    Monocytes 6 2 - 11 %    Eosinophils % 0 (L) 1 - 4 %    Basophils 0 0 - 2 %    Segs Absolute 9.10 (H) 1.8 - 7.7 k/uL    Absolute Lymph # 0.80 (L) 1.0 - 4.8 k/uL    Absolute Mono # 0.60 0.1 - 1.2 k/uL    Absolute Eos # 0.00 0.0 - 0.4 k/uL    Basophils Absolute 0.00 0.0 - 0.2 k/uL   Basic Metabolic Panel w/ Reflex to MG   Result Value Ref Range    Glucose 192 (H) 70 - 99 mg/dL    BUN 23 8 - 23 mg/dL    CREATININE 0.60 0.50 - 0.90 mg/dL    Calcium 9.9 8.6 - 10.4 mg/dL    Sodium 139 135 - 144 mmol/L    Potassium 3.9 3.7 - 5.3 mmol/L    Chloride 98 98 - 107 mmol/L    CO2 28 20 - 31 mmol/L    Anion Gap 13 9 - 17 mmol/L    GFR Non-African American >60 >60 mL/min    GFR African American >60 >60 mL/min    GFR Comment         Hepatic Function Panel   Result Value Ref Range    Albumin 4.8 3.5 - 5.2 g/dL    Alkaline Phosphatase 82 35 - 104 U/L    ALT 51 (H) 5 - 33 U/L    AST 31 <32 U/L    Total Bilirubin 0.60 0.3 - 1.2 mg/dL    Bilirubin, Direct 0.12 <0.31 mg/dL    Bilirubin, Indirect 0.48 0.00 - 1.00 mg/dL    Total Protein 8.1 6.4 - 8.3 g/dL    Albumin/Globulin Ratio 1.5 1.0 - 2.5   Troponin   Result Value Ref Range    Troponin, High Sensitivity 11 0 - 14 ng/L   Urinalysis with Reflex to Culture    Specimen: Urine   Result Value Ref Range    Color, UA Yellow Yellow    Turbidity UA SLIGHTLY CLOUDY (A) Clear    Glucose, Ur TRACE (A) NEGATIVE    Bilirubin Urine NEGATIVE NEGATIVE    Ketones, Urine MODERATE (A) NEGATIVE    Specific Gravity, UA 1.020 1.005 - 1.030    Urine Hgb TRACE (A) NEGATIVE    pH, UA 7.5 5.0 - 8.0    Protein, UA NEGATIVE NEGATIVE    Urobilinogen, Urine Normal Normal    Nitrite, Urine POSITIVE (A) NEGATIVE    Leukocyte Esterase, Urine NEGATIVE NEGATIVE   Microscopic Urinalysis   Result Value Ref Range    -          WBC, UA 5 TO 10 0 - 5 /HPF    RBC, UA 2 TO 5 0 - 2 /HPF    Epithelial Cells UA 2 TO 5 0 - 5 /HPF    Bacteria, UA MANY (A) None    Amorphous, UA 1+ (A) None    Other Observations UA (A) NOT REQ. Utilizing a urinalysis as the only screening method to exclude a potential uropathogen can be unreliable in many patient populations. Rapid screening tests are less sensitive than culture and if UTI is a clinical possibility, culture should be considered despite a negative urinalysis.    EKG 12 Lead   Result Value Ref Range    Ventricular Rate 61 BPM    Atrial Rate 61 BPM    P-R Interval 158 ms    QRS Duration 80 ms    Q-T Interval 452 ms    QTc Calculation (Bazett) 455 ms    P Axis 52 degrees    R Axis -10 degrees    T Axis 24 degrees       RADIOLOGY:  XR CHEST PORTABLE   Final Result   No acute focal airspace consolidation. CT Head WO Contrast   Final Result   1. Mild chronic small vessel ischemic white matter disease probable lacunar   infarct in the region of the superior right basal ganglia. Stable remote   left occipital lobe infarct. If there is clinical concern for acute on   chronic ischemia, an MRI with diffusion-weighted imaging would be a more   sensitive exam assuming there are no contraindications to MRI. 2. No acute intracranial hemorrhage or midline shift. 3. Atherosclerotic calcification of the vasculature. 4. No acute intracranial hemorrhage or midline shift. 5. Complete left maxillary sinus opacification. Mild ethmoid sinus disease. RECOMMENDATIONS:   Unavailable               ED Course     The patient was given the following medications:  Orders Placed This Encounter   Medications    0.9 % sodium chloride bolus    0.9 % sodium chloride infusion    ondansetron (ZOFRAN) injection 4 mg    meclizine (ANTIVERT) tablet 12.5 mg    ondansetron (ZOFRAN) injection 4 mg    nitrofurantoin (macrocrystal-monohydrate) (MACROBID) capsule 100 mg     Order Specific Question:   Antimicrobial Indications     Answer:   Urinary Tract Infection    nitrofurantoin, macrocrystal-monohydrate, (MACROBID) 100 MG capsule     Sig: Take 1 capsule by mouth 2 times daily for 7 days     Dispense:  14 capsule     Refill:  0    meclizine (ANTIVERT) 25 MG tablet     Sig: Take 1 tablet by mouth 3 times daily as needed for Dizziness     Dispense:  30 tablet     Refill:  0    ondansetron (ZOFRAN) 4 MG tablet     Sig: Take 1 tablet by mouth every 8 hours as needed for Nausea     Dispense:  20 tablet     Refill:  0         RECENT VITALS:  BP (!) 174/71   Pulse 59   Temp 97.5 °F (36.4 °C) (Oral)   Resp 18   Ht 5' 5\" (1.651 m)   Wt 62.6 kg (138 lb)   SpO2 100%   BMI 22.96 kg/m²     8:30 AM patient feels a little bit better with some fluids and Antivert.   CT of the head shows old infarct otherwise unremarkable for acute process chest x-ray unremarkable. Lab work no anemia. Electrolytes and kidney function normal.  Patient concerned that her pain pump might be throwing things off and would like me to talk to her pain management doctor. Will discuss with her pain management doctor. 9:30 AM was able to get up to the bathroom without difficulty on her own. Feeling much better dizziness resolved. Pain management no callback. Patient would ultimately like to go home. Will discharge with prescription for Zofran and Antivert. 10 AM positive UTI will treat with Macrobid in addition to above medications. Follow-up with family physician return if worsening symptoms or other concerns. The patient and her family member understands that at this time there is no evidence for a more malignant underlying process, but also understands that early in the process of an illness or injury, an emergency department workup can be falsely reassuring. Routine discharge counseling was given, and it is understood that worsening, changing or persistent symptoms should prompt an immediate call or follow up with their primary physician or return to the emergency department. The importance of appropriate follow up was also discussed. I have reviewed the disposition diagnosis. I have answered the questions and given discharge instructions. There was voiced understanding of these instructions and no further questions or complaints. OARRS Report if indicated             I have reviewed the disposition diagnosis with the patient and or their family/guardian. I have answered their questions and given discharge instructions. They voiced understanding of these instructions and did not have any further questions or complaints. Disposition     CLINICAL IMPRESSION:  1. Dizziness    2.  Acute UTI        PATIENT REFERRED TO:  MD Martina Mosley. [address]  [place] [address]  [phone]    Schedule an appointment as soon as possible for a visit in 3 days        DISCHARGE MEDICATIONS:  New Prescriptions    MECLIZINE (ANTIVERT) 25 MG TABLET    Take 1 tablet by mouth 3 times daily as needed for Dizziness    NITROFURANTOIN, MACROCRYSTAL-MONOHYDRATE, (MACROBID) 100 MG CAPSULE    Take 1 capsule by mouth 2 times daily for 7 days    ONDANSETRON (ZOFRAN) 4 MG TABLET    Take 1 tablet by mouth every 8 hours as needed for Nausea       DISPOSITION:   DISPOSITION        (Please note that portions of this note were completed with a voice recognition program.  Efforts were made to edit the dictations but occasionally words are mis-transcribed.)    DO KATRINA Smith DO  05/21/22 1002

## 2022-05-21 NOTE — ED NOTES
Patient brought in by [facility] with Chief complaint of Dizziness since last night at 9 p.m.and trouble ambulating patient arrived to room 8. At bedside female medic states patient chief complaint was brought to ED because patient thinks she is having a allergic reaction to change in her pain pump meds from one week ago. Patient states one week ago her pump was changed from fentanyl to morphine after she did not tolerate a numbing med that was placed. Patient states last night around 9 p.m. she starting feeling nauseated and dizzy. Patient denies SOB or chest pain States bilateral lower extremity feel numb and she is unable to ambulate. Patient states these symptoms aren't new, but have worsened since last night. Patient states she is nauseated. Denies diarrhea. Patient blood glucose per .           Jona Heller RN  05/21/22 9542

## 2022-05-21 NOTE — ED NOTES
[facility] called to have Dr.James allison from [facility] for  Pain Management     [address] [place] [address]  05/21/22 2029

## 2022-05-22 LAB
EKG ATRIAL RATE: 61 BPM
EKG P AXIS: 52 DEGREES
EKG P-R INTERVAL: 158 MS
EKG Q-T INTERVAL: 452 MS
EKG QRS DURATION: 80 MS
EKG QTC CALCULATION (BAZETT): 455 MS
EKG R AXIS: -10 DEGREES
EKG T AXIS: 24 DEGREES
EKG VENTRICULAR RATE: 61 BPM

## 2022-05-23 LAB — GLUCOSE BLD-MCNC: 192 MG/DL (ref 65–105)

## 2023-03-01 ENCOUNTER — HOSPITAL ENCOUNTER (EMERGENCY)
Facility: CLINIC | Age: 78
Discharge: HOME | End: 2023-03-01
Attending: EMERGENCY MEDICINE
Payer: MEDICARE

## 2023-03-01 VITALS
WEIGHT: 130 LBS | BODY MASS INDEX: 21.66 KG/M2 | TEMPERATURE: 98.9 F | HEIGHT: 65 IN | OXYGEN SATURATION: 97 % | DIASTOLIC BLOOD PRESSURE: 85 MMHG | HEART RATE: 64 BPM | SYSTOLIC BLOOD PRESSURE: 154 MMHG | RESPIRATION RATE: 18 BRPM

## 2023-03-01 DIAGNOSIS — S61.011A LACERATION OF RIGHT THUMB WITHOUT DAMAGE TO NAIL, FOREIGN BODY PRESENCE UNSPECIFIED, INITIAL ENCOUNTER: Primary | ICD-10-CM

## 2023-03-01 PROCEDURE — 90715 TDAP VACCINE 7 YRS/> IM: CPT

## 2023-03-01 PROCEDURE — 6360000002 HC RX W HCPCS

## 2023-03-01 PROCEDURE — 90471 IMMUNIZATION ADMIN: CPT

## 2023-03-01 PROCEDURE — 12001 RPR S/N/AX/GEN/TRNK 2.5CM/<: CPT

## 2023-03-01 PROCEDURE — 99284 EMERGENCY DEPT VISIT MOD MDM: CPT

## 2023-03-01 RX ADMIN — TETANUS TOXOID, REDUCED DIPHTHERIA TOXOID AND ACELLULAR PERTUSSIS VACCINE, ADSORBED 0.5 ML: 5; 2.5; 8; 8; 2.5 SUSPENSION INTRAMUSCULAR at 16:19

## 2023-03-01 ASSESSMENT — ENCOUNTER SYMPTOMS
COUGH: 0
NAUSEA: 0
DIARRHEA: 0
CHEST TIGHTNESS: 0
VOMITING: 0
ABDOMINAL PAIN: 0
SHORTNESS OF BREATH: 0

## 2023-03-01 ASSESSMENT — LIFESTYLE VARIABLES
HOW MANY STANDARD DRINKS CONTAINING ALCOHOL DO YOU HAVE ON A TYPICAL DAY: 1 OR 2
HOW OFTEN DO YOU HAVE A DRINK CONTAINING ALCOHOL: MONTHLY OR LESS

## 2023-03-01 ASSESSMENT — PAIN - FUNCTIONAL ASSESSMENT: PAIN_FUNCTIONAL_ASSESSMENT: NONE - DENIES PAIN

## 2023-03-01 NOTE — DISCHARGE INSTRUCTIONS
Your tetanus is updated today, is good for 10 years unless you have an injury in over 5 years, in that case it would need to be updated again at that time. Use ibuprofen or Tylenol (unless prescribed medications that have Tylenol in it) for pain. You can take over the counter Ibuprofen (advil) tablets (4 tablets every 8 hours or 3 tablets every 6 hours or 2 tablets every 4 hours)    You can shower with the laceration, would avoid baths or swimming in lakes / rivers. DO NOT apply bacitracin / triple antibiotic ointment / Neosporin / Vaseline to the wound. The glue will fall off in 5 - 7 days. After that you can apply sunscreen to the healing wound when outside to help with scarring. Return to the emergency department for worsening of pain, fever > 101.5, redness around the wound or redness streaking up the body part, white drainage from wound.

## 2023-03-01 NOTE — ED PROVIDER NOTES
Emergency Department         I reviewed the mid level provider's note and agree with the documented findings and we have discussed the plan of care. I have reviewed the emergency nurses triage note. I agree with the chief complaint, past medical history, past surgical history, allergies, medications, social and family history as documented unless otherwise noted below.       Courntey Escamilla DO  03/01/23 1301

## 2023-03-01 NOTE — ED PROVIDER NOTES
[facility]  [address] [facility]  Phone: [phone]        Pt Name: Estrella Stearns  MRN: [id number]  [place] 1945  Date of evaluation: 3/1/23    CHIEFCOMPLAINT       Chief Complaint   Patient presents with    Laceration       HISTORY OF PRESENT ILLNESS (Location/Symptom, Timing/Onset, Context/Setting, Quality, Duration, Modifying Factors, Severity)      Estrella Stearns is a 76 y.o. female with no pertinent PMH who presents to the ED via private auto with complaint of a laceration to her right thumb. Patient states she cut herself on the kitchen knife at home while cooking today about 1:00. Patient takes Eliquis, has been unable to control bleeding with pressure bandage at home. Patient unsure of her last tetanus shot, denies any pain at this time. No pain medication taken prior to arrival.    PAST MEDICAL / SURGICAL / SOCIAL / FAMILY HISTORY     PMH:  has a past medical history of Cancer ([address] Chronic back pain, Hyperthyroidism, Obstructive sleep apnea syndrome, and Urge incontinence. Surgical History:  has a past surgical history that includes Spine surgery (1989, 1990, 2008); Breast surgery (Left, 10/2012); and Abdominoplasty. Social History:  reports that she has never smoked. She has never been exposed to tobacco smoke. She has never used smokeless tobacco. She reports current alcohol use. She reports that she does not use drugs. Family History: She indicated that the status of her mother is unknown. She indicated that the status of her father is unknown. She indicated that the status of her sister is unknown. She indicated that the status of her maternal grandmother is unknown.   family history includes Breast Cancer in her maternal grandmother; Cancer in her mother; Heart Disease in her father and sister; Stroke in her father and sister.   Psychiatric History: None    Allergies: Ziconotide acetate and Sulfa antibiotics    Home Medications:   Prior to Admission medications    Medication Sig Start Date End Date Taking? Authorizing Provider   NONFORMULARY nanda    Historical Provider, MD   tamsulosin (FLOMAX) 0.4 MG capsule Take 1 capsule by mouth daily  Patient not taking: No sig reported 8/11/21   Zenobia Berumen DO   gabapentin (NEURONTIN) 300 MG capsule Take 300 mg by mouth 3 times daily.     Historical Provider, MD   metoprolol succinate (TOPROL XL) 50 MG extended release tablet Take 100 mg by mouth nightly     Historical Provider, MD   lisinopril (PRINIVIL;ZESTRIL) 20 MG tablet Take 20 mg by mouth daily    Historical Provider, MD   Cholecalciferol 50 MCG (2000 UT) TABS Take 2,000 Units by mouth daily    Historical Provider, MD   levothyroxine (SYNTHROID) 50 MCG tablet Take 50 mcg by mouth every morning (before breakfast)  12/14/20   Historical Provider, MD   NP THYROID 60 MG tablet Take 60 mg by mouth every morning (before breakfast)  12/2/20   Historical Provider, MD   atorvastatin (LIPITOR) 10 MG tablet Take 10 mg by mouth daily  Patient not taking: No sig reported    Historical Provider, MD   CRANBERRY PO Take 36 mg by mouth daily  Patient not taking: Reported on 3/1/2023    Historical Provider, MD   COD LIVER OIL PO Take by mouth daily  Patient not taking: Reported on 3/1/2023    Historical Provider, MD   magnesium gluconate (MAGONATE) 500 MG tablet Take 500 mg by mouth 2 times daily    Historical Provider, MD   Multiple Vitamins-Minerals (THERAPEUTIC MULTIVITAMIN-MINERALS) tablet Take 1 tablet by mouth daily    Historical Provider, MD   Turmeric (CURCUMIN 95 PO) Take by mouth  Patient not taking: Reported on 3/1/2023    Historical Provider, MD   ASTAXANTHIN PO Take by mouth  Patient not taking: Reported on 10/14/2021    Historical Provider, MD   [address] Take by mouth  Patient not taking: Reported on 3/1/2023    Historical Provider, MD   TAURINE PO Take by mouth  Patient not taking: Reported on 3/1/2023    Historical Provider, MD REVIEW OF SYSTEMS  (2-9 systems for level 4, 10 ormore for level 5)      Review of Systems   Constitutional:  Negative for chills and fever. Respiratory:  Negative for cough, chest tightness and shortness of breath. Cardiovascular:  Negative for chest pain, palpitations and leg swelling. Gastrointestinal:  Negative for abdominal pain, diarrhea, nausea and vomiting. Musculoskeletal:  Negative for myalgias and neck pain. Skin:  Negative for pallor and rash. Neurological:  Negative for dizziness, syncope, weakness, light-headedness, numbness and headaches. All other systems negative except as marked. PHYSICAL EXAM  (up to 7 for level 4, 8 or more for level 5)      INITIAL VITALS:  height is 5' 5\" (1.651 m) and weight is 59 kg (130 lb). Her temperature is 98.9 °F (37.2 °C). Her blood pressure is 154/85 (abnormal) and her pulse is 64. Her respiration is 18 and oxygen saturation is 97%. Vital signs reviewed. Physical Exam  Vitals and nursing note reviewed. Constitutional:       General: She is not in acute distress. Appearance: Normal appearance. She is normal weight. She is not ill-appearing, toxic-appearing or diaphoretic. HENT:      Head: Normocephalic and atraumatic. Right Ear: External ear normal.      Left Ear: External ear normal.      Nose: Nose normal. No congestion or rhinorrhea. Mouth/Throat:      Mouth: Mucous membranes are moist.      Pharynx: Oropharynx is clear. Eyes:      General: No scleral icterus. Right eye: No discharge. Left eye: No discharge. Extraocular Movements: Extraocular movements intact. Cardiovascular:      Rate and Rhythm: Normal rate and regular rhythm. Pulses: Normal pulses. Heart sounds: Normal heart sounds. No murmur heard. Pulmonary:      Effort: Pulmonary effort is normal. No respiratory distress. Breath sounds: Normal breath sounds. No stridor. No wheezing, rhonchi or rales.    Chest: Chest wall: No tenderness. Abdominal:      General: Abdomen is flat. There is no distension. Palpations: Abdomen is soft. Tenderness: There is no abdominal tenderness. There is no right CVA tenderness, left CVA tenderness, guarding or rebound. Musculoskeletal:         General: No deformity or signs of injury. Normal range of motion. Cervical back: Normal range of motion and neck supple. Right lower leg: No edema. Left lower leg: No edema. Lymphadenopathy:      Cervical: No cervical adenopathy. Skin:     General: Skin is warm and dry. Capillary Refill: Capillary refill takes less than 2 seconds. Coloration: Skin is not jaundiced or pale. Findings: Lesion (0.5 cm laceration to lateral aspect of thumb, no nail or tendon involvement. active oozing/bleeding) present. No bruising, erythema or rash. Neurological:      Mental Status: She is alert and oriented to person, place, and time. Cranial Nerves: No cranial nerve deficit. Sensory: No sensory deficit. Motor: No weakness. Gait: Gait normal.   Psychiatric:         Mood and Affect: Mood normal.         Behavior: Behavior normal.     DIFFERENTIAL DIAGNOSIS / MDM   Patient's right hand placed in chlorhexidine soak upon arrival.  On evaluation there is a half a centimeter laceration to the lateral aspect of her right thumb. PMS is intact to all fingers on the right hand, equal pulses to bilateral upper extremities. There is active bleeding noted on exam, I placed quick clot over the laceration and applied Coban and will reevaluate in 10 minutes. Patient demonstrates normal range of motion, PMS is intact to bilateral upper extremities. -Dermabond applied over laceration. No strikethrough noted on Coban bandage. Patient instructed to protect the area with a bulky gauze dressing, keep the area clean and dry at home, no soaking the laceration or doing dishes until the glue has fallen off.   Tetanus shot updated. PLAN (LABS / IMAGING / EKG):  No orders of the defined types were placed in this encounter. MEDICATIONS ORDERED:  Orders Placed This Encounter   Medications    tetanus-diphth-acell pertussis (BOOSTRIX) injection 0.5 mL       DIAGNOSTIC RESULTS   none    EMERGENCY DEPARTMENT COURSE     Vitals:    Vitals:    03/01/23 1544   BP: (!) 154/85   Pulse: 64   Resp: 18   Temp: 98.9 °F (37.2 °C)   SpO2: 97%   Weight: 59 kg (130 lb)   Height: 5' 5\" (1.651 m)     -------------------------  BP: (!) 154/85, Temp: 98.9 °F (37.2 °C), Heart Rate: 64, Resp: 18      RE-EVALUATION:  See ED Course notes above. DISCHARGE PLANNING:    Patient instructed to either follow-up with her primary care provider or return to the ER with any new or worsening symptoms. Patient instructed to use Tylenol or Motrin as needed for discomfort. She denies any pain during her visit. Patient instructed to keep the area clean and dry, no swimming, no baths or dishes, no Neosporin or bacitracin. We discussed signs and symptoms of infection such as redness, increased pain, swelling or foul odor and how this would warrant an immediate reevaluation. The patient and/or family and I have discussed the diagnosis and risks, and we agree with discharging home to follow-up with their pertinent providers. The patient appears stable for discharge and has been instructed to return immediately for new concerning symptoms or if the symptoms worsen in any way. The patient understands that at this time there is no evidence for a more malignant underlying process, but the patient also understands that early in the process of an illness or injury, an emergency department workup can be falsely reassuring. Routine discharge counseling was given, and the patient understands that worsening, changing or persistent symptoms should prompt an immediate call or follow up with their primary physician or return to the emergency department.     I have reviewed the disposition diagnosis with the patient and or their family/guardian. I have answered their questions and given discharge instructions. They voiced understanding of these instructions and did not have any further questions or complaints. This patient was seen by the attending physician and they agreed with the assessment and plan. CONSULTS:  None    PROCEDURES:  None    FINAL IMPRESSION      1. Laceration of right thumb without damage to nail, foreign body presence unspecified, initial encounter          DISPOSITION / PLAN     CONDITION ON DISPOSITION:   Good / Stable for discharge.      PATIENT REFERRED TO:  Kennedy Carlton MD  [facility]. [address]  [place] [address] [place] [address]  [phone]      As needed, If symptoms worsen    [facility]  [address]  [phone]  Go to   New or worsening symptoms    DISCHARGE MEDICATIONS:  New Prescriptions    No medications on file       Gloria Runner, APRN - [id number]   Emergency Medicine Nurse Practitioner    (Please note that portions of this note were completed with a voice recognition program.  Efforts were made to edit the dictations but occasionally words aremis-transcribed.)       Gloria Runner, APRN - CNP  03/01/23 6443

## 2023-03-03 ENCOUNTER — HOSPITAL ENCOUNTER (OUTPATIENT)
Dept: PREADMISSION TESTING | Age: 78
End: 2023-03-03
Payer: MEDICARE

## 2023-03-03 VITALS
OXYGEN SATURATION: 99 % | SYSTOLIC BLOOD PRESSURE: 177 MMHG | HEIGHT: 65 IN | WEIGHT: 130 LBS | TEMPERATURE: 98 F | RESPIRATION RATE: 18 BRPM | BODY MASS INDEX: 21.66 KG/M2 | DIASTOLIC BLOOD PRESSURE: 78 MMHG | HEART RATE: 64 BPM

## 2023-03-03 LAB
ANION GAP SERPL CALCULATED.3IONS-SCNC: 9 MMOL/L (ref 9–17)
BUN SERPL-MCNC: 22 MG/DL (ref 8–23)
CHLORIDE SERPL-SCNC: 106 MMOL/L (ref 98–107)
CO2 SERPL-SCNC: 28 MMOL/L (ref 20–31)
CREAT SERPL-MCNC: 0.7 MG/DL (ref 0.5–0.9)
EKG ATRIAL RATE: 64 BPM
EKG P AXIS: 77 DEGREES
EKG P-R INTERVAL: 182 MS
EKG Q-T INTERVAL: 418 MS
EKG QRS DURATION: 86 MS
EKG QTC CALCULATION (BAZETT): 431 MS
EKG R AXIS: -6 DEGREES
EKG T AXIS: 55 DEGREES
EKG VENTRICULAR RATE: 64 BPM
GFR SERPL CREATININE-BSD FRML MDRD: >60 ML/MIN/1.73M2
HCT VFR BLD AUTO: 41.2 % (ref 36.3–47.1)
HGB BLD-MCNC: 13.4 G/DL (ref 11.9–15.1)
MCH RBC QN AUTO: 32.4 PG (ref 25.2–33.5)
MCHC RBC AUTO-ENTMCNC: 32.5 G/DL (ref 28.4–34.8)
MCV RBC AUTO: 99.5 FL (ref 82.6–102.9)
NRBC AUTOMATED: 0 PER 100 WBC
PDW BLD-RTO: 12.5 % (ref 11.8–14.4)
PLATELET # BLD AUTO: 286 K/UL (ref 138–453)
PMV BLD AUTO: 8.6 FL (ref 8.1–13.5)
POTASSIUM SERPL-SCNC: 3.9 MMOL/L (ref 3.7–5.3)
RBC # BLD: 4.14 M/UL (ref 3.95–5.11)
SODIUM SERPL-SCNC: 143 MMOL/L (ref 135–144)
WBC # BLD AUTO: 4.7 K/UL (ref 3.5–11.3)

## 2023-03-03 PROCEDURE — 80051 ELECTROLYTE PANEL: CPT

## 2023-03-03 PROCEDURE — 93005 ELECTROCARDIOGRAM TRACING: CPT | Performed by: STUDENT IN AN ORGANIZED HEALTH CARE EDUCATION/TRAINING PROGRAM

## 2023-03-03 PROCEDURE — 85027 COMPLETE CBC AUTOMATED: CPT

## 2023-03-03 PROCEDURE — 84520 ASSAY OF UREA NITROGEN: CPT

## 2023-03-03 PROCEDURE — 82565 ASSAY OF CREATININE: CPT

## 2023-03-03 PROCEDURE — 36415 COLL VENOUS BLD VENIPUNCTURE: CPT

## 2023-03-03 RX ORDER — MORPHINE SULFATE/0.9% NACL/PF 1 MG/ML
SYRINGE (ML) INJECTION CONTINUOUS
COMMUNITY

## 2023-03-03 RX ORDER — COLCHICINE 0.6 MG/1
0.6 TABLET ORAL DAILY
COMMUNITY

## 2023-03-03 RX ORDER — ZINC GLUCONATE 50 MG
50 TABLET ORAL DAILY
COMMUNITY

## 2023-03-03 RX ORDER — ASPIRIN 81 MG/1
81 TABLET ORAL DAILY
COMMUNITY

## 2023-03-03 RX ORDER — CLOPIDOGREL BISULFATE 75 MG/1
75 TABLET ORAL DAILY
COMMUNITY

## 2023-03-03 RX ORDER — ROSUVASTATIN CALCIUM 20 MG/1
20 TABLET, COATED ORAL DAILY
COMMUNITY

## 2023-03-03 RX ORDER — NEBIVOLOL 20 MG/1
20 TABLET ORAL DAILY
COMMUNITY

## 2023-03-03 RX ORDER — GABAPENTIN 300 MG/1
300 CAPSULE ORAL 3 TIMES DAILY
COMMUNITY

## 2023-03-03 ASSESSMENT — PAIN DESCRIPTION - PAIN TYPE: TYPE: CHRONIC PAIN

## 2023-03-03 ASSESSMENT — PAIN DESCRIPTION - DESCRIPTORS: DESCRIPTORS: ACHING;SORE

## 2023-03-03 ASSESSMENT — PAIN SCALES - GENERAL: PAINLEVEL_OUTOF10: 3

## 2023-03-03 ASSESSMENT — PAIN DESCRIPTION - LOCATION: LOCATION: BACK

## 2023-03-03 ASSESSMENT — PAIN DESCRIPTION - ORIENTATION: ORIENTATION: LOWER

## 2023-03-03 ASSESSMENT — PAIN DESCRIPTION - FREQUENCY: FREQUENCY: CONTINUOUS

## 2023-03-03 NOTE — PRE-PROCEDURE INSTRUCTIONS
[facility], [facility] 13 at 1000 AM    Once you enter the hospital lobby, take the elevators to the second floor. Check-In is at the surgery registration desk. Continue to take your home medications as you normally do up to and including the night before surgery with the exception of any blood thinning medications. Please stop any blood thinning medications as directed by your surgeon or prescribing physician. Failure to stop certain medications may interfere with your scheduled surgery. These may include:  Aspirin, Warfarin (Coumadin), Clopidogrel (Plavix), Ibuprofen (Motrin, Advil), Naproxen (Aleve), Meloxicam (Mobic), Celecoxib (Celebrex), Eliquis, Pradaxa, Xarelto, Effient, Fish Oil, Herbal supplements. Stop aspirin and clopidogrel as directed by physician      Please take the following medication(s) the day of surgery with a small sip of water:  Nebivolol, levothyroxine, np thyriod        PREPARING FOR YOUR SURGERY:     Before surgery, you can play an important role in your own health. Because skin is not sterile, we need to be sure that your skin is as free of germs as possible before surgery by carefully washing before surgery. Preparing or prepping skin before surgery can reduce the risk of a surgical site infection.   Do not shave the area of your body where your surgery will be performed unless you received specific permission from your physician. You will need to shower at home the night before surgery and the morning of surgery with a special soap called chlorhexidine gluconate (CHG*). *Not to be used by people allergic to Chlorhexidine Gluconate (CHG). Following these instructions will help you be sure that your skin is clean before surgery. Instructions on cleaning your skin before surgery: The night before your surgery: You will need to shower with warm water (not hot) and the CHG soap. Use a clean wash cloth and a clean towel.   Have clean clothes available to put on after the shower. First wash your hair with regular shampoo. Rinse your hair and body thoroughly to remove the shampoo. Wash your face and genital area (private parts) with your regular soap or water only. Thoroughly rinse your body with warm water from the neck down. Turn water off to prevent rinsing the soap off too soon. With a clean wet washcloth and half of the CHG soap in the bottle, lather your entire body from the neck down. Do not use CHG soap near your eyes or ears to avoid injury to those areas. Wash thoroughly, paying special attention to the area where your surgery will be performed. Wash your body gently for five (5) minutes. Avoid scrubbing your skin too hard. Turn the water back on and rinse your body thoroughly. Pat yourself dry with a clean, soft towel. Do not apply lotion, cream or powder. Dress with clean freshly washed clothes. The morning of surgery:    Repeat shower following steps above - using remaining half of CHG soap in bottle. Patient Instructions: If you are having any type of anesthesia you are to have nothing to eat or drink after midnight the night before your surgery. This includes gum, hard candy, mints, water or smoking or chewing tobacco.  The only exception to this is a small sip of water to take with any morning dose of heart, blood pressure, or seizure medications. No alcoholic beverages for 24 hours prior to surgery. Brush your teeth but do not swallow water. Bring your eye glasses and case with you. No contacts are to be worn the day of surgery. You also may bring your hearing aids. Most surgical procedures involving anesthesia will require that you remove your dentures prior to surgery. Do not wear any jewelry or body piercings day of surgery. Also, NO lotion, perfume or deodorant to be used the day of surgery.   No nail polish on the operative extremity (arm/leg surgeries)    If you are staying overnight with us, please bring a small bag of necessary personal items. Please wear loose, comfortable clothing. If you are potentially going to have a cast or brace bring clothing that will fit over them. In case of illness - If you have cold or flu like symptoms (high fever, runny nose, sore throat, cough, etc.) rash, nausea, vomiting, loose stools, and/or recent contact with someone who has a contagious disease (chicken pox, measles, etc.) Please call your doctor before coming to the hospital.         Day of Surgery/Procedure:    As a patient at [facility] you can expect quality medical and nursing care that is centered on your individual needs. Our goal is to make your surgical experience as comfortable as possible    . Transportation After Your Surgery/Procedure: You will need a friend or family member to drive you home after your procedure. Your  must be 25years of age or older and able to sign off on your discharge instructions. A taxi cab or any other form of public transportation is not acceptable. Your friend or family member must stay at the hospital throughout your procedure. Someone must remain with you for the first 24 hours after your surgery if you receive anesthesia or medication. If you do not have someone to stay with you, your procedure may be cancelled.       If you have any other questions regarding your procedure or the day of surgery, please call [phone]      _________________________  ____________________________  Signature (Patient)              Signature (Provider)               Date

## 2023-04-13 ENCOUNTER — HOSPITAL ENCOUNTER (OUTPATIENT)
Age: 78
Setting detail: OUTPATIENT SURGERY
Discharge: HOME | End: 2023-04-13
Attending: PLASTIC SURGERY | Admitting: PLASTIC SURGERY
Payer: MEDICARE

## 2023-04-13 VITALS
HEART RATE: 69 BPM | WEIGHT: 134 LBS | HEIGHT: 65 IN | RESPIRATION RATE: 15 BRPM | OXYGEN SATURATION: 94 % | SYSTOLIC BLOOD PRESSURE: 172 MMHG | TEMPERATURE: 97.3 F | BODY MASS INDEX: 22.33 KG/M2 | DIASTOLIC BLOOD PRESSURE: 86 MMHG

## 2023-04-13 DIAGNOSIS — Z85.3 PERSONAL HISTORY OF BREAST CANCER: ICD-10-CM

## 2023-04-13 DIAGNOSIS — T85.44XD CAPSULAR CONTRACTURE OF BREAST IMPLANT, SUBSEQUENT ENCOUNTER: ICD-10-CM

## 2023-04-13 DIAGNOSIS — L59.9 DISORDER OF THE SKIN, SUBCU RELATED TO RADIATION, UNSP: ICD-10-CM

## 2023-04-13 DIAGNOSIS — Z90.12 ACQUIRED ABSENCE OF LEFT BREAST AND NIPPLE: ICD-10-CM

## 2023-04-13 DIAGNOSIS — N64.4 MASTODYNIA: ICD-10-CM

## 2023-04-13 PROCEDURE — 2580000003 HC RX 258: Performed by: PLASTIC SURGERY

## 2023-04-13 PROCEDURE — 7100000000 HC PACU RECOVERY - FIRST 15 MIN: Performed by: PLASTIC SURGERY

## 2023-04-13 PROCEDURE — 7100000011 HC PHASE II RECOVERY - ADDTL 15 MIN: Performed by: PLASTIC SURGERY

## 2023-04-13 PROCEDURE — 3700000000 HC ANESTHESIA ATTENDED CARE: Performed by: PLASTIC SURGERY

## 2023-04-13 PROCEDURE — 88305 TISSUE EXAM BY PATHOLOGIST: CPT

## 2023-04-13 PROCEDURE — 2720000010 HC SURG SUPPLY STERILE: Performed by: PLASTIC SURGERY

## 2023-04-13 PROCEDURE — C9290 INJ, BUPIVACAINE LIPOSOME: HCPCS | Performed by: PLASTIC SURGERY

## 2023-04-13 PROCEDURE — 2709999900 HC NON-CHARGEABLE SUPPLY: Performed by: PLASTIC SURGERY

## 2023-04-13 PROCEDURE — 3600000012 HC SURGERY LEVEL 2 ADDTL 15MIN: Performed by: PLASTIC SURGERY

## 2023-04-13 PROCEDURE — 6360000002 HC RX W HCPCS: Performed by: PLASTIC SURGERY

## 2023-04-13 PROCEDURE — 3700000001 HC ADD 15 MINUTES (ANESTHESIA): Performed by: PLASTIC SURGERY

## 2023-04-13 PROCEDURE — 7100000010 HC PHASE II RECOVERY - FIRST 15 MIN: Performed by: PLASTIC SURGERY

## 2023-04-13 PROCEDURE — C1789 PROSTHESIS, BREAST, IMP: HCPCS | Performed by: PLASTIC SURGERY

## 2023-04-13 PROCEDURE — 2500000003 HC RX 250 WO HCPCS: Performed by: PLASTIC SURGERY

## 2023-04-13 PROCEDURE — 6370000000 HC RX 637 (ALT 250 FOR IP): Performed by: PLASTIC SURGERY

## 2023-04-13 PROCEDURE — 3600000002 HC SURGERY LEVEL 2 BASE: Performed by: PLASTIC SURGERY

## 2023-04-13 PROCEDURE — 7100000001 HC PACU RECOVERY - ADDTL 15 MIN: Performed by: PLASTIC SURGERY

## 2023-04-13 PROCEDURE — 2580000003 HC RX 258: Performed by: ANESTHESIOLOGY

## 2023-04-13 DEVICE — IMPLANTABLE DEVICE: Type: IMPLANTABLE DEVICE | Site: BREAST | Status: FUNCTIONAL

## 2023-04-13 RX ORDER — GINSENG 100 MG
CAPSULE ORAL PRN
Status: DISCONTINUED | OUTPATIENT
Start: 2023-04-13 | End: 2023-04-13 | Stop reason: ALTCHOICE

## 2023-04-13 RX ORDER — SODIUM CHLORIDE 9 MG/ML
INJECTION, SOLUTION INTRAVENOUS PRN
Status: DISCONTINUED | OUTPATIENT
Start: 2023-04-13 | End: 2023-04-13 | Stop reason: HOSPADM

## 2023-04-13 RX ORDER — SODIUM CHLORIDE 9 MG/ML
INJECTION, SOLUTION INTRAVENOUS CONTINUOUS
Status: DISCONTINUED | OUTPATIENT
Start: 2023-04-13 | End: 2023-04-13 | Stop reason: HOSPADM

## 2023-04-13 RX ORDER — SODIUM CHLORIDE, SODIUM LACTATE, POTASSIUM CHLORIDE, CALCIUM CHLORIDE 600; 310; 30; 20 MG/100ML; MG/100ML; MG/100ML; MG/100ML
INJECTION, SOLUTION INTRAVENOUS CONTINUOUS
Status: DISCONTINUED | OUTPATIENT
Start: 2023-04-13 | End: 2023-04-13 | Stop reason: HOSPADM

## 2023-04-13 RX ORDER — SODIUM CHLORIDE 0.9 % (FLUSH) 0.9 %
5-40 SYRINGE (ML) INJECTION PRN
Status: DISCONTINUED | OUTPATIENT
Start: 2023-04-13 | End: 2023-04-13 | Stop reason: HOSPADM

## 2023-04-13 RX ORDER — FENTANYL CITRATE 50 UG/ML
25 INJECTION, SOLUTION INTRAMUSCULAR; INTRAVENOUS EVERY 5 MIN PRN
Status: DISCONTINUED | OUTPATIENT
Start: 2023-04-13 | End: 2023-04-13 | Stop reason: HOSPADM

## 2023-04-13 RX ORDER — SODIUM CHLORIDE 0.9 % (FLUSH) 0.9 %
5-40 SYRINGE (ML) INJECTION EVERY 12 HOURS SCHEDULED
Status: DISCONTINUED | OUTPATIENT
Start: 2023-04-13 | End: 2023-04-13 | Stop reason: HOSPADM

## 2023-04-13 RX ORDER — LIDOCAINE HYDROCHLORIDE 10 MG/ML
1 INJECTION, SOLUTION EPIDURAL; INFILTRATION; INTRACAUDAL; PERINEURAL
Status: DISCONTINUED | OUTPATIENT
Start: 2023-04-14 | End: 2023-04-13 | Stop reason: HOSPADM

## 2023-04-13 RX ORDER — SODIUM CHLORIDE, SODIUM LACTATE, POTASSIUM CHLORIDE, AND CALCIUM CHLORIDE .6; .31; .03; .02 G/100ML; G/100ML; G/100ML; G/100ML
500 INJECTION, SOLUTION INTRAVENOUS ONCE
Status: DISCONTINUED | OUTPATIENT
Start: 2023-04-13 | End: 2023-04-13 | Stop reason: CLARIF

## 2023-04-13 RX ORDER — ONDANSETRON 2 MG/ML
4 INJECTION INTRAMUSCULAR; INTRAVENOUS
Status: DISCONTINUED | OUTPATIENT
Start: 2023-04-13 | End: 2023-04-13 | Stop reason: HOSPADM

## 2023-04-13 RX ORDER — HYDROMORPHONE HYDROCHLORIDE 1 MG/ML
0.5 INJECTION, SOLUTION INTRAMUSCULAR; INTRAVENOUS; SUBCUTANEOUS EVERY 5 MIN PRN
Status: DISCONTINUED | OUTPATIENT
Start: 2023-04-13 | End: 2023-04-13 | Stop reason: HOSPADM

## 2023-04-13 RX ORDER — METOCLOPRAMIDE HYDROCHLORIDE 5 MG/ML
5 INJECTION INTRAMUSCULAR; INTRAVENOUS
Status: DISCONTINUED | OUTPATIENT
Start: 2023-04-13 | End: 2023-04-13 | Stop reason: CLARIF

## 2023-04-13 RX ORDER — KETOROLAC TROMETHAMINE 15 MG/ML
15 INJECTION, SOLUTION INTRAMUSCULAR; INTRAVENOUS ONCE
Status: DISCONTINUED | OUTPATIENT
Start: 2023-04-13 | End: 2023-04-13 | Stop reason: HOSPADM

## 2023-04-13 RX ADMIN — SODIUM CHLORIDE, POTASSIUM CHLORIDE, SODIUM LACTATE AND CALCIUM CHLORIDE: 600; 310; 30; 20 INJECTION, SOLUTION INTRAVENOUS at 11:11

## 2023-04-13 ASSESSMENT — PAIN - FUNCTIONAL ASSESSMENT
PAIN_FUNCTIONAL_ASSESSMENT: 0-10
PAIN_FUNCTIONAL_ASSESSMENT: PREVENTS OR INTERFERES SOME ACTIVE ACTIVITIES AND ADLS

## 2023-04-13 ASSESSMENT — PAIN DESCRIPTION - DESCRIPTORS: DESCRIPTORS: ACHING

## 2023-04-13 NOTE — DISCHARGE INSTRUCTIONS
Post  Op Discharge Instructions    1. ACTIVITY  _X___ No driving , operating machinery, or making important decisions for 24 hours. Resume normal activity after 24 hours. No heavy lifting until 1st office visit. 2. DIET       _X__   Resume your usual diet unless specified below. ____ Diet Modification:    3. MEDICATIONS     Meds as prescribed      4. PHYSICIAN FOLLOW-UP  Appt already scheduled    5.  ADDITIONAL INSTRUCTIONS      Empty drains and milk tubing when half full or every 8 hours      Record drainage totals on I/O sheet   Keep dressing in place until follow up    Resume Plavix on Sunday    If you have additional questions, PLEASE call your doctor

## 2023-04-14 NOTE — OP NOTE
breast  cancer, left mastectomy, expander implant reconstruction and equalizing  augmentation on the right side. 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  The above procedures were planned and  we fully reviewed all potential risks, limitations, complications, and  also the limitations imposed by the tissue damage from radiation and the  possibility of recurrent contracture. OPERATIVE PROCEDURE:  The patient was seen in the preoperative area to  again review plans for the procedure. In upright position, reference  points were carefully marked including the previous scars. The patient  was taken to the operating room and given general intubated anesthesia,  received 2 gm of cefazolin, 140 mg of gentamicin, 10 mg of Decadron IV  preoperatively. EPC cuffs were placed. The chest was prepped and  draped in a sterile fashion. The procedure was done with headlight  illumination. The tumescent fluid consisted of dilute lidocaine,  epinephrine with 500 mg tranexamic acid and total of 200 mL solution,  which was used to infiltrate the areas for tissue dissection and the  incisions to reduce bleeding. The revision of the right breast  reconstruction was marked with a superior medial pedicle for  reconstructive mastopexy. The pedicle was deepithelialized to the upper  incision. The periprosthetic capsule was carefully dissected and upon  opening it was noted that there was intracapsular gel consistent with  gel rupture from her implant, which was a McGhan 400 mL style 410  textured gel placed in 2005. The implant and gel were carefully  removed, all gel evacuated and a Biofilm protocol peroxide, Betadine,  and small amount of Hibiclens was used to irrigate the pocket. Gloves  were changed.   On the left breast, the previous inframammary scar was  excised, periprosthetic capsule was identified and opened and

## 2023-04-17 LAB — SURGICAL PATHOLOGY REPORT: NORMAL

## (undated) DEVICE — DRAPE,REIN 53X77,STERILE: Brand: MEDLINE

## (undated) DEVICE — YANKAUER,FLEXIBLE HANDLE,REGLR CAPACITY: Brand: MEDLINE INDUSTRIES, INC.

## (undated) DEVICE — BLANKET WRM W40.2XL55.9IN IORT LO BODY + MISTRAL AIR

## (undated) DEVICE — INTENDED FOR TISSUE SEPARATION, AND OTHER PROCEDURES THAT REQUIRE A SHARP SURGICAL BLADE TO PUNCTURE OR CUT.: Brand: BARD-PARKER ® CARBON RIB-BACK BLADES

## (undated) DEVICE — TOWEL,OR,DSP,ST,BLUE,DLX,XR,4/PK,20PK/CS: Brand: MEDLINE

## (undated) DEVICE — APPLICATOR MEDICATED 26 CC SOLUTION HI LT ORNG CHLORAPREP

## (undated) DEVICE — SYRINGE IRRIG 60ML SFT PLIABLE BLB EZ TO GRP 1 HND USE W/

## (undated) DEVICE — BLADE ES ELASTOMERIC COAT INSUL DURABLE BEND UPTO 90DEG

## (undated) DEVICE — NEEDLE SPINAL 22GA L3.5IN SPINOCAN

## (undated) DEVICE — NEEDLE HYPO 25GA L1.5IN BLU POLYPR HUB S STL REG BVL STR

## (undated) DEVICE — GLOVE SURG SZ 8 CRM LTX FREE POLYISOPRENE POLYMER BEAD ANTI

## (undated) DEVICE — SPONGE LAP W18XL18IN WHT COT 4 PLY FLD STRUNG RADPQ DISP ST 2 PER PACK

## (undated) DEVICE — SYRINGE 20ML LL S/C 50

## (undated) DEVICE — PLASMABLADE PS210-030S 3.0S LOCK: Brand: PLASMABLADE™

## (undated) DEVICE — SUTURE VCRL SZ 0 L36IN ABSRB UD L36MM CT-1 1/2 CIR J946H

## (undated) DEVICE — SURGICAL PROCEDURE KIT BASIN MAJ SET UP

## (undated) DEVICE — GARMENT,MEDLINE,DVT,INT,CALF,MED, GEN2: Brand: MEDLINE

## (undated) DEVICE — DECANTER BAG 9": Brand: MEDLINE INDUSTRIES, INC.

## (undated) DEVICE — DRAIN SURG 15FR SIL RND CHN W/ TRCR FULL FLUT DBL WRP TRAD

## (undated) DEVICE — SUTURE PDS II SZ 2-0 L27IN ABSRB VLT L36MM CT-1 1/2 CIR Z339H

## (undated) DEVICE — CONTAINER,SPECIMEN,OR STERILE,4OZ: Brand: MEDLINE

## (undated) DEVICE — SKIN PREP TRAY W/CHG: Brand: MEDLINE INDUSTRIES, INC.

## (undated) DEVICE — GLOVE SURG SZ 65 CRM LTX FREE POLYISOPRENE POLYMER BEAD ANTI

## (undated) DEVICE — Device

## (undated) DEVICE — SYRINGE, LUER LOCK, 10ML: Brand: MEDLINE

## (undated) DEVICE — DRAPE,MEDI-SLUSH,STERILE: Brand: MEDLINE

## (undated) DEVICE — SUTURE PDS II SZ 3-0 L27IN ABSRB VLT L26MM SH 1/2 CIR Z316H

## (undated) DEVICE — MARKER,SKIN,WI/RULER AND LABELS: Brand: MEDLINE

## (undated) DEVICE — SUTURE MCRYL SZ 3-0 L27IN ABSRB UD L24MM PS-1 3/8 CIR PRIM Y936H

## (undated) DEVICE — BLADE ES L6IN ELASTOMERIC COAT INSUL DURABLE BEND UPTO